# Patient Record
Sex: FEMALE | Race: WHITE | Employment: FULL TIME | URBAN - METROPOLITAN AREA
[De-identification: names, ages, dates, MRNs, and addresses within clinical notes are randomized per-mention and may not be internally consistent; named-entity substitution may affect disease eponyms.]

---

## 2017-01-30 ENCOUNTER — APPOINTMENT (OUTPATIENT)
Dept: CT IMAGING | Facility: CLINIC | Age: 52
End: 2017-01-30
Attending: EMERGENCY MEDICINE
Payer: COMMERCIAL

## 2017-01-30 ENCOUNTER — HOSPITAL ENCOUNTER (OUTPATIENT)
Facility: CLINIC | Age: 52
Setting detail: OBSERVATION
Discharge: HOME | End: 2017-01-31
Attending: EMERGENCY MEDICINE | Admitting: UROLOGY
Payer: COMMERCIAL

## 2017-01-30 DIAGNOSIS — N23 RENAL COLIC: ICD-10-CM

## 2017-01-30 DIAGNOSIS — N20.0 LEFT NEPHROLITHIASIS: ICD-10-CM

## 2017-01-30 DIAGNOSIS — N20.1 CALCULUS OF URETER: Primary | ICD-10-CM

## 2017-01-30 LAB
ALBUMIN UR-MCNC: 30 MG/DL
ANION GAP SERPL CALCULATED.3IONS-SCNC: 11 MMOL/L (ref 3–14)
APPEARANCE UR: ABNORMAL
BILIRUB UR QL STRIP: NEGATIVE
BUN SERPL-MCNC: 18 MG/DL (ref 7–30)
CALCIUM SERPL-MCNC: 8.7 MG/DL (ref 8.5–10.1)
CAOX CRY #/AREA URNS HPF: ABNORMAL /HPF
CHLORIDE SERPL-SCNC: 106 MMOL/L (ref 94–109)
CO2 SERPL-SCNC: 22 MMOL/L (ref 20–32)
COLOR UR AUTO: ABNORMAL
CREAT SERPL-MCNC: 0.68 MG/DL (ref 0.52–1.04)
GFR SERPL CREATININE-BSD FRML MDRD: NORMAL ML/MIN/1.7M2
GLUCOSE SERPL-MCNC: 94 MG/DL (ref 70–99)
GLUCOSE UR STRIP-MCNC: NEGATIVE MG/DL
HGB UR QL STRIP: ABNORMAL
KETONES UR STRIP-MCNC: 5 MG/DL
LEUKOCYTE ESTERASE UR QL STRIP: ABNORMAL
MUCOUS THREADS #/AREA URNS LPF: PRESENT /LPF
NITRATE UR QL: NEGATIVE
PH UR STRIP: 6.5 PH (ref 5–7)
POTASSIUM SERPL-SCNC: 4.1 MMOL/L (ref 3.4–5.3)
RBC #/AREA URNS AUTO: >182 /HPF (ref 0–2)
SODIUM SERPL-SCNC: 139 MMOL/L (ref 133–144)
SP GR UR STRIP: 1.01 (ref 1–1.03)
SQUAMOUS #/AREA URNS AUTO: 3 /HPF (ref 0–1)
URN SPEC COLLECT METH UR: ABNORMAL
UROBILINOGEN UR STRIP-MCNC: NORMAL MG/DL (ref 0–2)
WBC #/AREA URNS AUTO: 0 /HPF (ref 0–2)

## 2017-01-30 PROCEDURE — 81001 URINALYSIS AUTO W/SCOPE: CPT | Performed by: EMERGENCY MEDICINE

## 2017-01-30 PROCEDURE — 96374 THER/PROPH/DIAG INJ IV PUSH: CPT

## 2017-01-30 PROCEDURE — 25000128 H RX IP 250 OP 636: Performed by: EMERGENCY MEDICINE

## 2017-01-30 PROCEDURE — 96375 TX/PRO/DX INJ NEW DRUG ADDON: CPT

## 2017-01-30 PROCEDURE — 87086 URINE CULTURE/COLONY COUNT: CPT | Performed by: PHYSICIAN ASSISTANT

## 2017-01-30 PROCEDURE — 25000128 H RX IP 250 OP 636: Performed by: PHYSICIAN ASSISTANT

## 2017-01-30 PROCEDURE — G0378 HOSPITAL OBSERVATION PER HR: HCPCS

## 2017-01-30 PROCEDURE — 25000125 ZZHC RX 250: Performed by: EMERGENCY MEDICINE

## 2017-01-30 PROCEDURE — 25000125 ZZHC RX 250: Performed by: PHYSICIAN ASSISTANT

## 2017-01-30 PROCEDURE — 96372 THER/PROPH/DIAG INJ SC/IM: CPT

## 2017-01-30 PROCEDURE — 80048 BASIC METABOLIC PNL TOTAL CA: CPT | Performed by: EMERGENCY MEDICINE

## 2017-01-30 PROCEDURE — 99285 EMERGENCY DEPT VISIT HI MDM: CPT | Mod: 25

## 2017-01-30 PROCEDURE — 25000132 ZZH RX MED GY IP 250 OP 250 PS 637: Performed by: EMERGENCY MEDICINE

## 2017-01-30 PROCEDURE — 96376 TX/PRO/DX INJ SAME DRUG ADON: CPT

## 2017-01-30 PROCEDURE — 74176 CT ABD & PELVIS W/O CONTRAST: CPT

## 2017-01-30 PROCEDURE — 96361 HYDRATE IV INFUSION ADD-ON: CPT

## 2017-01-30 PROCEDURE — 99219 ZZC INITIAL OBSERVATION CARE,LEVL II: CPT | Performed by: PHYSICIAN ASSISTANT

## 2017-01-30 PROCEDURE — 87088 URINE BACTERIA CULTURE: CPT | Performed by: PHYSICIAN ASSISTANT

## 2017-01-30 RX ORDER — ACETAMINOPHEN 650 MG/1
650 SUPPOSITORY RECTAL EVERY 4 HOURS PRN
Status: DISCONTINUED | OUTPATIENT
Start: 2017-01-30 | End: 2017-01-31 | Stop reason: HOSPADM

## 2017-01-30 RX ORDER — LORAZEPAM 2 MG/ML
1 INJECTION INTRAMUSCULAR ONCE
Status: COMPLETED | OUTPATIENT
Start: 2017-01-30 | End: 2017-01-30

## 2017-01-30 RX ORDER — ACETAMINOPHEN 325 MG/1
650 TABLET ORAL EVERY 4 HOURS PRN
Status: DISCONTINUED | OUTPATIENT
Start: 2017-01-30 | End: 2017-01-31

## 2017-01-30 RX ORDER — ONDANSETRON 4 MG/1
4 TABLET, ORALLY DISINTEGRATING ORAL EVERY 6 HOURS PRN
Qty: 10 TABLET | Refills: 0 | Status: SHIPPED | OUTPATIENT
Start: 2017-01-30 | End: 2017-01-31

## 2017-01-30 RX ORDER — AMOXICILLIN 250 MG
1-2 CAPSULE ORAL 2 TIMES DAILY PRN
Status: DISCONTINUED | OUTPATIENT
Start: 2017-01-30 | End: 2017-01-31 | Stop reason: HOSPADM

## 2017-01-30 RX ORDER — HYDROMORPHONE HYDROCHLORIDE 1 MG/ML
.3-.5 INJECTION, SOLUTION INTRAMUSCULAR; INTRAVENOUS; SUBCUTANEOUS
Status: DISCONTINUED | OUTPATIENT
Start: 2017-01-30 | End: 2017-01-31 | Stop reason: HOSPADM

## 2017-01-30 RX ORDER — CEFAZOLIN SODIUM 2 G/100ML
2 INJECTION, SOLUTION INTRAVENOUS
Status: COMPLETED | OUTPATIENT
Start: 2017-01-31 | End: 2017-01-31

## 2017-01-30 RX ORDER — SODIUM CHLORIDE 9 MG/ML
INJECTION, SOLUTION INTRAVENOUS CONTINUOUS
Status: DISCONTINUED | OUTPATIENT
Start: 2017-01-30 | End: 2017-01-31

## 2017-01-30 RX ORDER — HYDROMORPHONE HYDROCHLORIDE 1 MG/ML
0.5 INJECTION, SOLUTION INTRAMUSCULAR; INTRAVENOUS; SUBCUTANEOUS
Status: DISCONTINUED | OUTPATIENT
Start: 2017-01-30 | End: 2017-01-30

## 2017-01-30 RX ORDER — POLYETHYLENE GLYCOL 3350 17 G/17G
17 POWDER, FOR SOLUTION ORAL DAILY PRN
Status: DISCONTINUED | OUTPATIENT
Start: 2017-01-30 | End: 2017-01-31 | Stop reason: HOSPADM

## 2017-01-30 RX ORDER — PROCHLORPERAZINE MALEATE 5 MG
5-10 TABLET ORAL EVERY 6 HOURS PRN
Status: DISCONTINUED | OUTPATIENT
Start: 2017-01-30 | End: 2017-01-31 | Stop reason: HOSPADM

## 2017-01-30 RX ORDER — NALOXONE HYDROCHLORIDE 0.4 MG/ML
.1-.4 INJECTION, SOLUTION INTRAMUSCULAR; INTRAVENOUS; SUBCUTANEOUS
Status: DISCONTINUED | OUTPATIENT
Start: 2017-01-30 | End: 2017-01-31

## 2017-01-30 RX ORDER — SODIUM CHLORIDE 9 MG/ML
1000 INJECTION, SOLUTION INTRAVENOUS CONTINUOUS
Status: DISCONTINUED | OUTPATIENT
Start: 2017-01-30 | End: 2017-01-30

## 2017-01-30 RX ORDER — KETOROLAC TROMETHAMINE 30 MG/ML
30 INJECTION, SOLUTION INTRAMUSCULAR; INTRAVENOUS ONCE
Status: COMPLETED | OUTPATIENT
Start: 2017-01-30 | End: 2017-01-30

## 2017-01-30 RX ORDER — OXYCODONE AND ACETAMINOPHEN 5; 325 MG/1; MG/1
1 TABLET ORAL ONCE
Status: COMPLETED | OUTPATIENT
Start: 2017-01-30 | End: 2017-01-30

## 2017-01-30 RX ORDER — LIDOCAINE 40 MG/G
CREAM TOPICAL
Status: DISCONTINUED | OUTPATIENT
Start: 2017-01-30 | End: 2017-01-31 | Stop reason: HOSPADM

## 2017-01-30 RX ORDER — ONDANSETRON 2 MG/ML
4 INJECTION INTRAMUSCULAR; INTRAVENOUS EVERY 6 HOURS PRN
Status: DISCONTINUED | OUTPATIENT
Start: 2017-01-30 | End: 2017-01-31

## 2017-01-30 RX ORDER — BISACODYL 10 MG
10 SUPPOSITORY, RECTAL RECTAL DAILY PRN
Status: DISCONTINUED | OUTPATIENT
Start: 2017-01-30 | End: 2017-01-31 | Stop reason: HOSPADM

## 2017-01-30 RX ORDER — OXYCODONE AND ACETAMINOPHEN 5; 325 MG/1; MG/1
1-2 TABLET ORAL EVERY 4 HOURS PRN
Qty: 15 TABLET | Refills: 0 | Status: SHIPPED | OUTPATIENT
Start: 2017-01-30 | End: 2017-01-31

## 2017-01-30 RX ORDER — ONDANSETRON 4 MG/1
4 TABLET, ORALLY DISINTEGRATING ORAL EVERY 6 HOURS PRN
Status: DISCONTINUED | OUTPATIENT
Start: 2017-01-30 | End: 2017-01-31

## 2017-01-30 RX ORDER — TAMSULOSIN HYDROCHLORIDE 0.4 MG/1
0.4 CAPSULE ORAL DAILY
Qty: 5 CAPSULE | Refills: 0 | Status: SHIPPED | OUTPATIENT
Start: 2017-01-30 | End: 2017-01-31

## 2017-01-30 RX ORDER — PROCHLORPERAZINE 25 MG
25 SUPPOSITORY, RECTAL RECTAL EVERY 12 HOURS PRN
Status: DISCONTINUED | OUTPATIENT
Start: 2017-01-30 | End: 2017-01-31 | Stop reason: HOSPADM

## 2017-01-30 RX ORDER — ONDANSETRON 2 MG/ML
4 INJECTION INTRAMUSCULAR; INTRAVENOUS EVERY 30 MIN PRN
Status: DISCONTINUED | OUTPATIENT
Start: 2017-01-30 | End: 2017-01-30

## 2017-01-30 RX ADMIN — ONDANSETRON HYDROCHLORIDE 4 MG: 2 SOLUTION INTRAMUSCULAR; INTRAVENOUS at 12:53

## 2017-01-30 RX ADMIN — LORAZEPAM 1 MG: 2 INJECTION INTRAMUSCULAR; INTRAVENOUS at 07:31

## 2017-01-30 RX ADMIN — HYDROMORPHONE HYDROCHLORIDE 0.5 MG: 1 INJECTION, SOLUTION INTRAMUSCULAR; INTRAVENOUS; SUBCUTANEOUS at 23:36

## 2017-01-30 RX ADMIN — SODIUM CHLORIDE 1000 ML: 9 INJECTION, SOLUTION INTRAVENOUS at 12:51

## 2017-01-30 RX ADMIN — HYDROMORPHONE HYDROCHLORIDE 0.5 MG: 1 INJECTION, SOLUTION INTRAMUSCULAR; INTRAVENOUS; SUBCUTANEOUS at 13:48

## 2017-01-30 RX ADMIN — HYDROMORPHONE HYDROCHLORIDE 0.5 MG: 1 INJECTION, SOLUTION INTRAMUSCULAR; INTRAVENOUS; SUBCUTANEOUS at 18:44

## 2017-01-30 RX ADMIN — HYDROMORPHONE HYDROCHLORIDE 0.5 MG: 1 INJECTION, SOLUTION INTRAMUSCULAR; INTRAVENOUS; SUBCUTANEOUS at 21:22

## 2017-01-30 RX ADMIN — KETOROLAC TROMETHAMINE 30 MG: 30 INJECTION, SOLUTION INTRAMUSCULAR at 07:44

## 2017-01-30 RX ADMIN — HYDROMORPHONE HYDROCHLORIDE 0.5 MG: 1 INJECTION, SOLUTION INTRAMUSCULAR; INTRAVENOUS; SUBCUTANEOUS at 07:44

## 2017-01-30 RX ADMIN — KETOROLAC TROMETHAMINE 30 MG: 30 INJECTION, SOLUTION INTRAMUSCULAR at 12:52

## 2017-01-30 RX ADMIN — HYDROMORPHONE HYDROCHLORIDE 0.5 MG: 1 INJECTION, SOLUTION INTRAMUSCULAR; INTRAVENOUS; SUBCUTANEOUS at 16:00

## 2017-01-30 RX ADMIN — SODIUM CHLORIDE: 9 INJECTION, SOLUTION INTRAVENOUS at 13:48

## 2017-01-30 RX ADMIN — OXYCODONE HYDROCHLORIDE AND ACETAMINOPHEN 1 TABLET: 5; 325 TABLET ORAL at 12:30

## 2017-01-30 RX ADMIN — HYDROMORPHONE HYDROCHLORIDE 1 MG: 1 INJECTION, SOLUTION INTRAMUSCULAR; INTRAVENOUS; SUBCUTANEOUS at 07:09

## 2017-01-30 RX ADMIN — ONDANSETRON HYDROCHLORIDE 4 MG: 2 SOLUTION INTRAMUSCULAR; INTRAVENOUS at 07:44

## 2017-01-30 RX ADMIN — HYDROMORPHONE HYDROCHLORIDE 0.5 MG: 1 INJECTION, SOLUTION INTRAMUSCULAR; INTRAVENOUS; SUBCUTANEOUS at 12:52

## 2017-01-30 RX ADMIN — SODIUM CHLORIDE: 9 INJECTION, SOLUTION INTRAVENOUS at 22:49

## 2017-01-30 ASSESSMENT — ENCOUNTER SYMPTOMS: FLANK PAIN: 1

## 2017-01-30 ASSESSMENT — PAIN DESCRIPTION - DESCRIPTORS: DESCRIPTORS: CONSTANT

## 2017-01-30 NOTE — PHARMACY-ADMISSION MEDICATION HISTORY
Admission medication history interview status for the 1/30/2017  admission is complete. See EPIC admission navigator for prior to admission medications     Medication history source reliability:Moderate    Actions taken by pharmacist (provider contacted, etc):Patient states she does not take any Rx or OTC medications at home on a daily basis. Patient, however, was in pain during interview and was very sleepy after being given a dose of Dilaudid.     Additional medication history information not noted on PTA med list :None    Medication reconciliation/reorder completed by provider prior to medication history? No    Time spent in this activity: 5 minutes

## 2017-01-30 NOTE — DISCHARGE INSTRUCTIONS
"   * KIDNEY STONE (w/ Colic)    The sharp cramping pain and nausea/vomiting that you have is due to a small stone which has formed in the kidney and is now passing down a narrow tube (ureter) on its way to your bladder. Once it reaches your bladder, the pain will stop. The stone may pass in your urine stream in one piece. [The size may be 1/16\" to 1/4\" (1-6mm)]. Or, the stone may also break up into kenneth fragments which you may not even notice.  Once you have had a kidney stone there is a risk for recurrence in the future.  HOME CARE:    Drink lots of fluid (at least 8-10 glasses of water a day).    Most stones will pass on their own, but may take from a few hours to a few days.    Each time you urinate, do so in a jar. Pour the urine from the jar through the strainer and into the toilet. Continue doing this until 24 hours after your pain stops. By then, if there was a kidney stone, it should pass from your bladder. Some stones dissolve into sand-like particles and pass right through the strainer. In that case, you won't ever see a stone.    Save any stone that you find in the strainer and bring it to your doctor for analysis. It may be possible to prevent certain types of stones from forming. Therefore, it is important to know what kind of stone you have.    Try to stay as active as possible since this will help the stone pass. Do not stay in bed unless your pain prevents you from getting up. You may notice a red, pink or brown color to your urine. This is normal while passing a kidney stone.  FOLLOW UP with your doctor or return to this facility if the pain lasts more than 48 hours.  GET PROMPT MEDICAL ATTENTION if any of the following occur:    Pain that is not controlled by the medicine given    Repeated vomiting or unable to keep down fluids    Weakness, dizziness or fainting    Fever over 101  F (38.3  C)    Passage of solid red or brown urine (can't see through it) or urine with lots of blood clots    Unable " to pass urine for 8 hours and increasing bladder pressure    [id number] Edilia Gonzalez, [address] [place] [address], [place] [address]. All rights reserved. This information is not intended as a substitute for professional medical care. Always follow your healthcare professional's instructions.

## 2017-01-30 NOTE — H&P
Physician Attestation  I, Mirza Fernandez, saw and evaluated Jess Hudson as part of a shared visit.  I have reviewed and discussed with the advanced practice provider their history, physical and plan.    I personally reviewed the vital signs and medications.    My key history or physical exam findings: pt feeling uncomfortable at this point.  Hx of kidney stones.  Discussed with urology PA and plans for cysto, lithotripsy and stent tomorrow.      Key management decisions made by me: appreciate urology and plan as above.  Continue with pain control and antiemetics as needed.      Mirza Fernandez  Date of Service (when I saw the patient): 01/30/2017

## 2017-01-30 NOTE — ED AVS SNAPSHOT
[facility] Observation Unit    [address] [facility] [address]    Phone:  [phone]                                       Jess Hudson   MRN: [id number]    Department:  [facility]   Date of Visit:  1/30/2017           After Visit Summary Signature Page     I have received my discharge instructions, and my questions have been answered. I have discussed any challenges I see with this plan with the nurse or doctor.    ..........................................................................................................................................  Patient/Patient Representative Signature      ..........................................................................................................................................  Patient Representative Print Name and Relationship to Patient    ..................................................               ................................................  Date                                            Time    ..........................................................................................................................................  Reviewed by Signature/Title    ...................................................              ..............................................  Date                                                            Time

## 2017-01-30 NOTE — ED AVS SNAPSHOT
"  Emergency Department    [address] [facility] [address]    Phone:  [phone]    Fax:  [phone]                                       Jess Hudson   MRN: [id number]    Department:   Emergency Department   Date of Visit:  1/30/2017           Patient Information     Date Of Birth          1965        Your diagnoses for this visit were:     Renal colic        You were seen by Donovan Pastrana MD.      Follow-up Information     Call Aureliano Forrest MD.    Specialty:  Urology    Contact information:    [facility]  [address] [facility] [address]  [facility] [address] [phone]          Discharge Instructions          * KIDNEY STONE (w/ Colic)    The sharp cramping pain and nausea/vomiting that you have is due to a small stone which has formed in the kidney and is now passing down a narrow tube (ureter) on its way to your bladder. Once it reaches your bladder, the pain will stop. The stone may pass in your urine stream in one piece. [The size may be 1/16\" to 1/4\" (1-6mm)]. Or, the stone may also break up into kenneth fragments which you may not even notice.  Once you have had a kidney stone there is a risk for recurrence in the future.  HOME CARE:    Drink lots of fluid (at least 8-10 glasses of water a day).    Most stones will pass on their own, but may take from a few hours to a few days.    Each time you urinate, do so in a jar. Pour the urine from the jar through the strainer and into the toilet. Continue doing this until 24 hours after your pain stops. By then, if there was a kidney stone, it should pass from your bladder. Some stones dissolve into sand-like particles and pass right through the strainer. In that case, you won't ever see a stone.    Save any stone that you find in the strainer and bring it to your doctor for analysis. It may be possible to prevent certain types of stones from forming. Therefore, it is important to know what kind of stone you have.    Try to " stay as active as possible since this will help the stone pass. Do not stay in bed unless your pain prevents you from getting up. You may notice a red, pink or brown color to your urine. This is normal while passing a kidney stone.  FOLLOW UP with your doctor or return to this facility if the pain lasts more than 48 hours.  GET PROMPT MEDICAL ATTENTION if any of the following occur:    Pain that is not controlled by the medicine given    Repeated vomiting or unable to keep down fluids    Weakness, dizziness or fainting    Fever over 101  F (38.3  C)    Passage of solid red or brown urine (can't see through it) or urine with lots of blood clots    Unable to pass urine for 8 hours and increasing bladder pressure    [id number] [facility], [address] [place] [address], [place] [address]. All rights reserved. This information is not intended as a substitute for professional medical care. Always follow your healthcare professional's instructions.      24 Hour Appointment Hotline       To make an appointment at any [facility], call [phone] ([phone]). If you don't have a family doctor or clinic, we will help you find one. [facility] clinics are conveniently located to serve the needs of you and your family.             Review of your medicines      START taking        Dose / Directions Last dose taken    ondansetron 4 MG ODT tab   Commonly known as:  ZOFRAN ODT   Dose:  4 mg   Quantity:  10 tablet        Place 1 tablet (4 mg) under the tongue every 6 hours as needed for nausea   Refills:  0        oxyCODONE-acetaminophen 5-325 MG per tablet   Commonly known as:  PERCOCET   Dose:  1-2 tablet   Quantity:  15 tablet        Take 1-2 tablets by mouth every 4 hours as needed for pain   Refills:  0        tamsulosin 0.4 MG capsule   Commonly known as:  FLOMAX   Dose:  0.4 mg   Quantity:  5 capsule        Take 1 capsule (0.4 mg) by mouth daily   Refills:  0                Prescriptions were sent or printed at these  locations (3 Prescriptions)                   Other Prescriptions                Printed at Department/Unit printer (3 of 3)         oxyCODONE-acetaminophen (PERCOCET) 5-325 MG per tablet               ondansetron (ZOFRAN ODT) 4 MG ODT tab               tamsulosin (FLOMAX) 0.4 MG capsule                Procedures and tests performed during your visit     Procedure/Test Number of Times Performed    CT Abdomen Pelvis w/o Contrast 1    Peripheral IV: Standard 1    Pulse oximetry nursing 2    Strain urine 1    UA with Microscopic 1      Orders Needing Specimen Collection     None      Pending Results     Date and Time Order Name Status Description    1/30/2017 0707 CT Abdomen Pelvis w/o Contrast Preliminary             Pending Culture Results     No orders found from 1/29/2017 to 1/31/2017.       Test Results from your hospital stay           1/30/2017  9:30 AM - Interface, [employer] Results      Component Results     Component Value Ref Range & Units Status    Color Urine Light Red  Final    Appearance Urine Slightly Cloudy  Final    Glucose Urine Negative NEG mg/dL Final    Bilirubin Urine Negative NEG Final    Ketones Urine 5 (A) NEG mg/dL Final    Specific Gravity Urine 1.015 1.003 - 1.035 Final    Blood Urine Large (A) NEG Final    pH Urine 6.5 5.0 - 7.0 pH Final    Protein Albumin Urine 30 (A) NEG mg/dL Final    Urobilinogen mg/dL Normal 0.0 - 2.0 mg/dL Final    Nitrite Urine Negative NEG Final    Leukocyte Esterase Urine Trace (A) NEG Final    Source Midstream Urine  Final    WBC Urine 0 0 - 2 /HPF Final    RBC Urine >182 (H) 0 - 2 /HPF Final    Squamous Epithelial /HPF Urine 3 (H) 0 - 1 /HPF Final    Mucous Urine Present (A) NEG /LPF Final    Calcium Oxalate Many (A) NEG /HPF Final         1/30/2017  8:33 AM - Interface, Radiant Ib      Narrative     CT ABDOMEN PELVIS WITHOUT CONTRAST January 30, 2017 8:08 AM     HISTORY: Left flank pain.    TECHNIQUE: Renal stone protocol was performed. Volumetric  helical  sections were acquired from the lung bases through the ischial  tuberosities without IV contrast. Coronal images were also  reconstructed. Radiation dose for this scan was reduced using  automated exposure control, adjustment of the mA and/or kV according  to patient size, or iterative reconstruction technique.    COMPARISON: CT of the abdomen and pelvis without IV contrast performed  4/18/2012.    FINDINGS: There are multiple (at least five) obstructing stones in the  left renal pelvis and proximal left ureter, causing mild to moderate  left hydronephrosis. The largest of these stones is in the left renal  pelvis, measuring 0.7 cm. A 0.3 cm calcification in the left pelvis is  also new since the previous exam, and may be within the distal left  ureter near the ureterovesical junction, although the distal portion  of the left ureter is not well visualized. These left ureteral and  collecting system stones are new since the previous exam. Two  nonobstructing stones in the lower pole of the right kidney measure  0.2 cm each, and are not significantly changed since the previous  exam. No hydronephrosis on the right.     No bowel obstruction. The appendix is not visualized. No free fluid in  the pelvis. The liver, gallbladder, spleen, adrenal glands, and  pancreas have unremarkable noncontrast appearances. A left ovarian  cystic lesion measures 3.6 cm. Small hiatal hernia. Bilateral breast  implants are partially included on this exam. The visualized lung  bases are clear.        Impression     IMPRESSION:   1. There are multiple obstructing stones in the left renal pelvis and  proximal left ureter, causing mild to moderate left hydronephrosis.  The largest stone is in the left renal pelvis and measures 0.7 cm.   2. There may also be an obstructing stone in the distal left ureter  measuring 0.3 cm.   3. Two nonobstructing stones in the right kidney measures 0.2 cm each.    4. There is a 3.6 cm left ovarian  cystic lesion. Pelvic ultrasound may  be helpful for further characterization.                        Clinical Quality Measure: Blood Pressure Screening     Your blood pressure was checked while you were in the emergency department today. The last reading we obtained was  BP: 119/81 mmHg . Please read the guidelines below about what these numbers mean and what you should do about them.  If your systolic blood pressure (the top number) is less than 120 and your diastolic blood pressure (the bottom number) is less than 80, then your blood pressure is normal. There is nothing more that you need to do about it.  If your systolic blood pressure (the top number) is 120-139 or your diastolic blood pressure (the bottom number) is 80-89, your blood pressure may be higher than it should be. You should have your blood pressure rechecked within a year by a primary care provider.  If your systolic blood pressure (the top number) is 140 or greater or your diastolic blood pressure (the bottom number) is 90 or greater, you may have high blood pressure. High blood pressure is treatable, but if left untreated over time it can put you at risk for heart attack, stroke, or kidney failure. You should have your blood pressure rechecked by a primary care provider within the next 4 weeks.  If your provider in the emergency department today gave you specific instructions to follow-up with your doctor or provider even sooner than that, you should follow that instruction and not wait for up to 4 weeks for your follow-up visit.        Thank you for choosing [facility]       Thank you for choosing [facility] for your care. Our goal is always to provide you with excellent care. Hearing back from our patients is one way we can continue to improve our services. Please take a few minutes to complete the written survey that you may receive in the mail after you visit with us. Thank you!        [facility] [employer] Information     [employer] lets you send messages to  "your doctor, view your test results, renew your prescriptions, schedule appointments and more. To sign up, go to [email or web address] . Click on \"Log in\" on the left side of the screen, which will take you to the Welcome page. Then click on \"Sign up Now\" on the right side of the page.     You will be asked to enter the access code listed below, as well as some personal information. Please follow the directions to create your username and password.     Your access code is: [email or web address]  Expires: 2017  9:09 AM     Your access code will  in 90 days. If you need help or a new code, please call your [facility] clinic or [phone].        Care EveryWhere ID     This is your Care EveryWhere ID. This could be used by other organizations to access your [facility] medical records  [id number]        After Visit Summary       This is your record. Keep this with you and show to your community pharmacist(s) and doctor(s) at your next visit.                  "

## 2017-01-30 NOTE — PROGRESS NOTES
Goals to be met before Discharge:    1) Urology evaluation - Met  2) Pain control - Not met  3) Surgery - Not met, scheduled for 0800 tomorrow    Nurse to notify provider when goals are met

## 2017-01-30 NOTE — PLAN OF CARE
Problem: Goal Outcome Summary  Goal: Goal Outcome Summary  Outcome: No Change  VSS. A/O. Plan for NPO at midnight for surgery tomorrow @ 0800 with Dr. Forrest.  IV Dilaudid given with some relief. Patient has not voided since arriving to unit, strainer in bathroom. Up SBA.

## 2017-01-30 NOTE — CONSULTS
Urology consult for: Obstructing nephrolithiasis in the right ureter   Req provider: DANNA Lora      HPI: Pt is an assigned 52yo female pt of Dr. Limon with a PMH significant for complex urolithiasis. Pt last had obstructing stones in 2012 with a right stent placed 4/21/12 by Dr. Forrest and a right ESWL performed by Dr. Forrest 5/2/12, preceded by temporary RIGHT PCNT due to impacted right UPJ stones.  Pt has chosen not to followup with Dr. Forrest since the stent removal after surgery. Pt has had a colic attack she thinks last yr, evaluated at [facility]. Have no access to those records and pt unable to recall any specifics.    Pt presented to [facility] ED this morning at 7am with severe right flank pain. Pt reports the pain started 30 minutes prior to presentation and has been severe since then. Pt reports the pain is in her low left flank and wraps around the left lateral abdomen. Pt denies any other symptoms. Pt denies frequency, urgency, dysuria, hematuria, pyuria, or concern for UTI. Pt is unsure if she's emptying her bladder well due to pain. Pt's only complaint is severe pain. Pt is amenable to surgery, but defers to her mother who is at bedside.       Past Medical History   Diagnosis Date     Kidney stone      Review Of Systems:  General: Denies F/C, history limited to pt's distress from pain  Respiratory: Denies SOB  Cardiovascular: Denies CP  Gastrointestinal: Denies N/V  Genitourinary: See HPI  Musculoskeletal: Denies LE pain  Neurologic: Denies HA  Psychiatric: Denies confusion  Skin: no concerning lesions    Past Surgical History   Procedure Laterality Date     Appendectomy       Breast surgery       enlarged     Cystoscopy, retrogrades, insert stent ureter(s), combined  4/21/2012     Procedure:COMBINED CYSTOSCOPY, RETROGRADES, INSERT STENT URETER(S); Cysto,Attempted Right Double J Stent Placement; Surgeon:VAISHALI FORREST; Location: OR     Extracorporeal shock wave lithotripsy (eswl)  5/2/2012      Procedure:EXTRACORPOREAL SHOCK WAVE LITHOTRIPSY (ESWL); Right  EXTRACORPOREAL SHOCK WAVE LITHOTRIPSY, removal of right stent; Surgeon:VAISHALI COX; Location: OR     Cystoscopy, remove stent(s), combined  5/2/2012     Procedure:COMBINED CYSTOSCOPY, REMOVE STENT(S); Surgeon:VAISHALI COX; Location: OR     Social Hx:  Social History     Social History     Marital Status: Single     Spouse Name: N/A     Number of Children: N/A     Years of Education: N/A     Occupational History     Not on file.     Social History Main Topics     Smoking status: Former Smoker     Types: Cigarettes     Quit date: 05/01/2005     Smokeless tobacco: Not on file     Alcohol Use: No     Drug Use: No     Sexual Activity: Not on file     Other Topics Concern     Not on file     Social History Narrative     Meds:  Current Outpatient Prescriptions   Medication Sig Dispense Refill     oxyCODONE-acetaminophen (PERCOCET) 5-325 MG per tablet Take 1-2 tablets by mouth every 4 hours as needed for pain 15 tablet 0     ondansetron (ZOFRAN ODT) 4 MG ODT tab Place 1 tablet (4 mg) under the tongue every 6 hours as needed for nausea 10 tablet 0     tamsulosin (FLOMAX) 0.4 MG capsule Take 1 capsule (0.4 mg) by mouth daily 5 capsule 0     Allergies   Allergen Reactions     Amoxicillin      As a child, unknown     Labs:  ROUTINE IP LABS (Last four results)  BMP  Recent Labs  Lab 01/30/17  0740      POTASSIUM 4.1   CHLORIDE 106   BUD 8.7   CO2 22   BUN 18   CR 0.68   GLC 94     CBCNo lab results found in last 7 days.  INRNo lab results found in last 7 days.    UA RESULTS:  Recent Labs   Lab Test  01/30/17   0911   COLOR  Light Red   APPEARANCE  Slightly Cloudy   URINEGLC  Negative   URINEBILI  Negative   URINEKETONE  5*   SG  1.015   UBLD  Large*   URINEPH  6.5   PROTEIN  30*   NITRITE  Negative   LEUKEST  Trace*   RBCU  >182*   WBCU  0     Imaging:CT ABDOMEN PELVIS WITHOUT CONTRAST January 30, 2017 8:08 AM       HISTORY: Left flank  pain.     TECHNIQUE: Renal stone protocol was performed. Volumetric helical  sections were acquired from the lung bases through the ischial  tuberosities without IV contrast. Coronal images were also  reconstructed. Radiation dose for this scan was reduced using  automated exposure control, adjustment of the mA and/or kV according  to patient size, or iterative reconstruction technique.     COMPARISON: CT of the abdomen and pelvis without IV contrast performed  4/18/2012.     FINDINGS: There are multiple (at least five) obstructing stones in the  left renal pelvis and proximal left ureter, causing mild to moderate  left hydronephrosis. The largest of these stones is in the left renal  pelvis, measuring 0.7 cm. A 0.3 cm calcification in the left pelvis is  also new since the previous exam, and may be within the distal left  ureter near the ureterovesical junction, although the distal portion  of the left ureter is not well visualized. These left ureteral and  collecting system stones are new since the previous exam. Two  nonobstructing stones in the lower pole of the right kidney measure  0.2 cm each, and are not significantly changed since the previous  exam. No hydronephrosis on the right.       No bowel obstruction. The appendix is not visualized. No free fluid in  the pelvis. The liver, gallbladder, spleen, adrenal glands, and  pancreas have unremarkable noncontrast appearances. A left ovarian  cystic lesion measures 3.6 cm. Small hiatal hernia. Bilateral breast  implants are partially included on this exam. The visualized lung  bases are clear.                                                                       IMPRESSION:    1. There are multiple obstructing stones in the left renal pelvis and  proximal left ureter, causing mild to moderate left hydronephrosis.  The largest stone is in the left renal pelvis and measures 0.7 cm.    2. There may also be an obstructing stone in the distal left ureter  measuring  "0.3 cm.    3. Two nonobstructing stones in the right kidney measures 0.2 cm each.     4. There is a 3.6 cm left ovarian cystic lesion. Pelvic ultrasound may  be helpful for further characterization.    Exam:  /63 mmHg  Pulse 74  Temp(Src) 95.5  F (35.3  C) (Axillary)  Resp 16  Ht 1.651 m (5' 5\")  Wt 65.137 kg (143 lb 9.6 oz)  BMI 23.90 kg/m2  SpO2 99%    Intake/Output Summary (Last 24 hours) at 01/30/17 1358  Last data filed at 01/30/17 1348   Gross per 24 hour   Intake   1000 ml   Output     30 ml   Net    970 ml     EXAM:  Constitutional: healthy, alert, cooperative, pt in some distress due to pain and unable to fully answer questions, though short answers she does give are appropriate   Cardiovascular: RRR  Respiratory: CTAB anteriorly, no secondary muscle use  Psychiatric: mentation appears normal and affect appropriate for a person in severe pain  Neck: no asymmetry  Abdomen: abdomen soft, non-tender on the right, diffuse left tenderness, severe low left flank pain not truly in the CVA region. No masses, no CVAT bilaterally  : No urinary catheter in place  MSK: no calf tenderness, no edema  Skin: no open lesions, extremities warm and well perfused      Assessment/plan:    Multiple obstructing stones in the left renal pelvis and proximal left ureter, causing mild to moderate left hydronephrosis.   - Plan for cysto, left ureteroscopy, holmium laser lithotripsy, left stent placement tomorrow at 8am with Dr. Forrest   -NPO at midnight   -Pain control, general medical concerns per hospitalist service      Discussed exam findings, lab and imaging results and plan with Dr. Forrest.  Please contact me with any questions or concerns.     Lisbet Coronel PA-C  [facility]  Pager:  [phone]  After 4pm and on weekends, please call [phone]    I reviewed most recent CT body imaging. I examined the pt and took a detailed history.   Jess has not followed up as I suggested in my last consultation " with her. I explained, again, to her and her mother that she over produces urinary oxalate (24-hr urine results in '12) and will likely be a lifelong stone . Emphasized the need for regular OV's to avoid episodes like what she is experiencing now. I explained that based on prior hx, may have difficulty accessing her left upper tracts. If that is indeed that case, like last hospitalization in '12, may need a temporary PCNT. She understands.     45 min total consultation time, >50% spent face to face with pt.advising Rx plan specific to her urological issues.   Aureliano Forrest

## 2017-01-30 NOTE — ED PROVIDER NOTES
History     Chief Complaint:  Flank Pain    HPI   Patient history limited secondary to distress    Jess Hudson is a 51 year old female with a history of recurrent kidney stones s/p lithotripsy and stent who presents with flank pain. The patient reports that she woke up this morning with severe left sided flank pain, prompting her to come to the ED for evaluation. On arrival to the ED, the patient reports she is in severe pain and is otherwise unable to provide any history.     Allergies:  Amoxicillin  Morphine sulfate      Medications:    No current outpatient prescriptions on file.    Past Medical History:    Kidney stone     Past Surgical History:    Appendectomy  Breast surgery  Cystoscopy, insert stent and subsequent removal   Lithotripsy     Family History:    No family history on file.    Social History:  Smoking status: Former smoker, quit 2005  Alcohol use: No  Marital Status:  Single [1]     Review of Systems   Unable to perform ROS: Acuity of condition   Genitourinary: Positive for flank pain.       Physical Exam   Patient Vitals for the past 24 hrs:   BP Temp Temp src Pulse Heart Rate Resp SpO2   01/30/17 1256 122/69 mmHg - - - - - -   01/30/17 1055 - - - - - - 98 %   01/30/17 1054 119/81 mmHg - - - - - 100 %   01/30/17 0955 131/88 mmHg - - - - - 98 %   01/30/17 0912 - 97.1  F (36.2  C) Oral - - - -   01/30/17 0813 125/78 mmHg - - - 55 12 92 %   01/30/17 0703 96/84 mmHg - - 74 - 30 100 %      Physical Exam  Constitutional: The patient is oriented to person, place, and time. Anxious. Uncomfortable. Writhing around on bed.   HENT:   Head: Atraumatic  Right Ear: Normal  Left Ear: Normal  Nose: Nose normal.   Mouth/Throat: Oropharynx is clear and moist. No erythema or exudate.   Eyes: Conjunctivae and EOM are normal. Pupils are equal, round, and reactive to light. No discharge  Neck: Normal range of motion. Neck supple.   Cardiovascular: Normal rate, regular rhythm, no murmur gallops or rubs. Intact  distal pulses.    Pulmonary/Chest: CTA bilaterally. No wheezes rale or rhonchi.  Abdominal: Soft. Non tender.  No masses   Musculoskeletal: No edema. No bony deformity. Normal range of motion  Lymphadenopathy:     The patient has no cervical adenopathy.   Neurological: The patient is alert and oriented to person, place, and time. The patient has normal strength and normal reflexes. No cranial nerve deficit. Coordination normal.  Skin: Skin is warm and dry. No rash noted. The patient is not diaphoretic.   Psychiatric: The patient has a normal mood. Anxious affect.     Emergency Department Course   Imaging:  Radiographic findings were communicated with the patient who voiced understanding of the findings.    CT-scan Abdomen/Pelvis w/o contrast:  1. There are multiple obstructing stones in the left renal pelvis and  proximal left ureter, causing mild to moderate left hydronephrosis.  The largest stone is in the left renal pelvis and measures 0.7 cm.   2. There may also be an obstructing stone in the distal left ureter  measuring 0.3 cm.   3. Two nonobstructing stones in the right kidney measures 0.2 cm each.    4. There is a 3.6 cm left ovarian cystic lesion. Pelvic ultrasound may  be helpful for further characterization.  Preliminary result per radiology.     Laboratory:  UA: Ketones 5, Blood large, Protein albumin 30, Leukocyte esterase trace, RBC/HPF >182(H), Squamous epithelial 3(H), Mucous present, Calcium oxalate many, o/w negative  BMP: WNL (Creatinine 0.68)    Interventions:  0709: Dilaudid 1 mg IM  0731: Ativan 1 mg IM  0744: Dilaudid 0.5 mg IV  0744: Toradol 30 mg IV  0744: Zofran 4 mg IV  1252: Toradol 30 mg IV  1230: Percocet 1 tablet oral     Emergency Department Course:  Past medical records, nursing notes, and vitals reviewed.  0721: I performed an exam of the patient and obtained history, as documented above.  IV inserted and blood drawn.  The patient was sent for a CT abdomen pelvis while in the  emergency department, findings above.    0851: I rechecked the patient. Explained findings to the patient. Patient's pain is somewhat improved.     0913: Patient ambulated to the bathroom without difficulty.     0937: I rechecked the patient. Patient states she would like to go home if possible.     1007: Urology paged  1034: Called back urology  1110: Called urology back again  1150: I spoke to Dr. Lyle of urology.    1159: I rechecked the patient. Explained findings to the patient. The patient would still like to go home.     1230: As the patient was being discharged she starting complaining of return of her 9/10 pain. Will plan for admit.   IV started    1238: I spoke to Dr. Forrest of urology.   1255: I spoke to Dr. Fernandez of the hospitalist service who accepts the patient for admission.     Findings and plan explained to the Patient who consents to admission.   Discussed the patient with Dr. Fernandez, who will admit the patient to an obs bed for further monitoring, evaluation, and treatment.     Impression & Plan      Medical Decision Making:  Patient presents with severe left sided flank pain. Based on history and exam I suspect this likely represents renal colic. CT scan of the abdomen and pelvis does show a large obstructing intrarenal pelvis stone as well as a proximal ureteral stone and distal ureteral stone. Urinalysis shows hematuria but no signs that would suggest infectious process. Patient was quite anxious, IV was started and she initially received IM Dilaudid and then IV Dilaudid and Toradol was able to get her pain under control. We discussed that she would likely need to have some type of intervention given the size and location of the stone. She was feeling better and wanted to make a try at home and was up for discharge when her pain became significantly worse and it was decided at this time that she would come in for pain control. I spoke with Dr. Fernandez on for the hospitalist service and Dr. Forrest  on for urology and the patient will be admitted for further evaluation and treatment.     Diagnosis:    ICD-10-CM   1. Renal colic N23     Disposition: Admitted under the care of Dr. Fernandez    Discharge Medications:  New Prescriptions    ONDANSETRON (ZOFRAN ODT) 4 MG ODT TAB    Place 1 tablet (4 mg) under the tongue every 6 hours as needed for nausea    OXYCODONE-ACETAMINOPHEN (PERCOCET) 5-325 MG PER TABLET    Take 1-2 tablets by mouth every 4 hours as needed for pain    TAMSULOSIN (FLOMAX) 0.4 MG CAPSULE    Take 1 capsule (0.4 mg) by mouth daily     Chiquita Fountain  1/30/2017    EMERGENCY DEPARTMENT    I, Chiquita Fountain, am serving as a scribe at 7:21 AM on 1/30/2017 to document services personally performed by Donovan Pastrana MD based on my observations and the provider's statements to me.       Donovan Pastrana MD  01/30/17 5345

## 2017-01-30 NOTE — ED NOTES
Pt stated pain was well controlled upon review of discharge instructions.  Upon arrival of the pts ride, she began having severe back pain again and stated she felt that needed to stay for IV pain management.

## 2017-01-30 NOTE — H&P
PRIMARY CARE PHYSICIAN:  None listed.      CHIEF COMPLAINT:  Left flank pain.      HISTORY OF PRESENT ILLNESS:  Jess Hudson is a 51-year-old female with past medical history significant for recurrent urolithiasis who presented to [facility] Emergency Department earlier today with complaints of left flank pain.  The patient woke up early this morning with severe left-sided flank pain prompting her to come to the Emergency Department for further evaluation.  Due to patient's severe pain, her history is limited.  The patient was seen and evaluated in the Emergency Department by Dr. Pastrana.  Emergency department workup included a BMP within normal limits.  Urinalysis positive for greater than 182 red blood cells, many calcium oxalate.  A CT abdomen and pelvis was performed that showed multiple obstructing stones in the left renal pelvis and proximal left ureter causing mild to moderate left hydronephrosis, largest stone is measuring 0.7 cm.  Due to persistence of her pain, Hospitalist Service was contacted for admission.      Of note, the patient does have history of recurrent urolithiasis dating back into her mid 30s.  She has followed with Dr. Forrest in the past.  She has required ureteral stent placement in the past. The patient reports stone analyses have been done in the past; however, these have been inconclusive.      I met with the patient in the Emergency Department.  The patient is in significant pain and is unable to provide much of a history due to pain.  She reports that her symptoms again began this morning.  She denies any fever or urinary symptoms.  Currently, endorses some pain radiating into her abdomen but otherwise denies any acute chest pain, shortness of breath or issues with diarrhea or constipation.      REVIEW OF SYSTEMS:  A complete review of systems was performed and is negative other than as noted in HPI.      PAST MEDICAL HISTORY:  Recurrent nephrolithiasis.      PRIOR TO ADMISSION  MEDICATIONS:  None.      ALLERGIES:  Amoxicillin.      PAST SURGICAL HISTORY:   1.  Appendectomy.   2.  Breast augmentation.      FAMILY HISTORY:  The patient's father has history of coronary artery disease.      SOCIAL HISTORY:  The patient denies tobacco abuse, alcohol use or illicit drug use.      PHYSICAL EXAMINATION:   VITAL SIGNS:  Temperature 97.1, heart rate 55, blood pressure 122/69, respiratory rate is 16, oxygen saturations 94% on room air.   GENERAL:  Uncomfortable appearing female.   HEENT:  Pupils equal, round and reactive to light.  Mucous membranes moist.   NECK:  No thyromegaly or lymphadenopathy.   CARDIOVASCULAR:  Regular rate and rhythm.  No murmurs.   RESPIRATORY:  Lungs clear to auscultation bilaterally.  No increased work of breathing.   ABDOMEN:  Soft, left-sided tenderness, nondistended.  Normoactive bowel sounds.   EXTREMITIES:  Distal pulses intact in lower extremities bilaterally.  No pedal edema.   NEUROLOGIC:  Oriented x3.   PSYCHIATRIC:  Calm and cooperative.      LABORATORY DATA:  Reviewed in Epic.      ASSESSMENT AND PLAN:  Jess Hudson is a 51-year-old female with past medical history significant for recurrent nephrolithiasis who is being admitted to [facility] observation unit for obstructing left renal stones.      Obstructive renal stones, left renal pelvis and proximal left ureter with moderate left hydronephrosis.  Symptoms began on the morning of 01/30.  CT of the abdomen and pelvis in the Emergency Department demonstrated multiple obstructing stones in the left renal pelvis and proximal left ureter with mild to moderate left hydronephrosis with the largest measuring 0.7 cm.  Urinalysis shows greater than 182 red blood cells and many calcium oxalate.  BMP is within normal limits.  Due to persistence of pain Hospitalist patient will be admitted to observation unit.   --  IV fluids.   --  PRN antiemetics and IV Dilaudid for pain control.   --  Urology consulted, plan  for cysto, left ureteroscopy, and lithotripsy with stent placement tomorrow with Dr. Forrest  --  NPO at midnight   --  Recheck BMP tomorrow morning.     Deep venous thrombosis prophylaxis:  PCDs.      CODE STATUS:  Full code.      The patient was discussed with Dr. Marcelo Fernandez who agrees with the plan as outlined above.         MARCELO FERNANDEZ, DO       As dictated by LANDON RICHARDSON PA-C            D: 2017 13:26   T: 2017 14:06   MT: KEITH      Name:     BIB KWAN   MRN:      [id number]        Account:      [id number]   :      1965           Admitted:     [id number]      Document: [id number]

## 2017-01-30 NOTE — ED AVS SNAPSHOT
MRN:[id number]                      After Visit Summary   1/30/2017    Jess Hudson    MRN: [id number]           Thank you!     Thank you for choosing [facility] for your care. Our goal is always to provide you with excellent care. Hearing back from our patients is one way we can continue to improve our services. Please take a few minutes to complete the written survey that you may receive in the mail after you visit with us. Thank you!        Patient Information     Date Of Birth          1965        About your hospital stay     You were admitted on:  January 30, 2017 [facility]    You were discharged on:  January 31, 2017        Reason for your hospital stay       You were registered to observation due to kidney stone.                  Who to Call     For medical emergencies, please call 911.  For non-urgent questions about your medical care, please call your primary care provider or clinic, None  For questions related to your surgery, please call your surgery clinic        Attending Provider     Provider    Donovan Pastrana MD Maresh, Andrew Bruce, DO Utz, William J, MD       Primary Care Provider    Physician No Ref-Primary       No address on file        After Care Instructions     Activity       Your activity upon discharge: activity as tolerated            Diet       Follow this diet upon discharge: Orders Placed This Encounter  Advance Diet as Tolerated: Regular Diet Adult            Discharge Instructions       You have a left ureteral stent to help allow drainage of urine from your kidney to bladder.  Your stent is not permanent, it requires removal with your urologist based on your plan of care.  While the stent is in place you may notice:  -Flank / back/ side  pain on the same side of your body as the stent  -Blood tinged urine that will come and go  -Sensation that you need to urinate more frequently   -Bladder  cramping/discomfort at the end of urination  These are all normal to experience with the stent in place. However, if these symptoms become too bothersome to tolerate, you can all our office to discuss medications to help with the discomfort, though the stent will need to stay in place until your follow-up appointment to allow your body to heal after surgery.            Discharge Instructions       You are being prescribed a narcotic pain medication. This is a strong pain medication that should be used to keep your pain to a tolerable level, but no more than necessary as it can be addictive and has side effects. With the narcotic pain medicine, you might experience some constipation, so you may want to take an over the counter stool softener like dulcolax, prune juice, or a fiber supplement such as Metamucil or Benefiber to keep your bowel movements soft and regular. You should also be very careful with driving, as the pain medicine may dull your senses and reflexes. Avoid alcohol, as this can unexpectedly increase the effect of the pain medicine.    You are also being prescribed Oxybutyinin for any bladder spasms or frequency with the stent. You should only take this medication if you're having urinary frequency or urgency or bladder spasms. This medication can cause constipation, urinary retention, dry mouth, or vision changes, so stop taking it if you experience these problems.                  Follow-up Appointments     Follow Up       Please follow up with Dr. Forrest on Thursday, February 23th at 2pm for a routine post-hospital check and stent removal.    [facility]  [address] [place] [address], [address]  [place]. [address]    You may call [phone] with any questions or concerns.            Follow Up       You are scheduled for a CT scan at the [facility] office at 10am on Wednesday, 2/22/17. You should come with a comfortably full bladder, but there are otherwise no other preparations that  "need so be done for this appointment    [facility]  [address], [address]  [place]. [address]    You may call [phone] with any questions or concerns.            Follow-up and recommended labs and tests        Follow up with primary care provider, Physician No Ref-Primary, within 7 days for hospital follow- up.  No follow up labs or test are needed.                  Further instructions from your care team          * KIDNEY STONE (w/ Colic)    The sharp cramping pain and nausea/vomiting that you have is due to a small stone which has formed in the kidney and is now passing down a narrow tube (ureter) on its way to your bladder. Once it reaches your bladder, the pain will stop. The stone may pass in your urine stream in one piece. [The size may be 1/16\" to 1/4\" (1-6mm)]. Or, the stone may also break up into kenneth fragments which you may not even notice.  Once you have had a kidney stone there is a risk for recurrence in the future.  HOME CARE:    Drink lots of fluid (at least 8-10 glasses of water a day).    Most stones will pass on their own, but may take from a few hours to a few days.    Each time you urinate, do so in a jar. Pour the urine from the jar through the strainer and into the toilet. Continue doing this until 24 hours after your pain stops. By then, if there was a kidney stone, it should pass from your bladder. Some stones dissolve into sand-like particles and pass right through the strainer. In that case, you won't ever see a stone.    Save any stone that you find in the strainer and bring it to your doctor for analysis. It may be possible to prevent certain types of stones from forming. Therefore, it is important to know what kind of stone you have.    Try to stay as active as possible since this will help the stone pass. Do not stay in bed unless your pain prevents you from getting up. You may notice a red, pink or brown color to your urine. This is normal while " "passing a kidney stone.  FOLLOW UP with your doctor or return to this facility if the pain lasts more than 48 hours.  GET PROMPT MEDICAL ATTENTION if any of the following occur:    Pain that is not controlled by the medicine given    Repeated vomiting or unable to keep down fluids    Weakness, dizziness or fainting    Fever over 101  F (38.3  C)    Passage of solid red or brown urine (can't see through it) or urine with lots of blood clots    Unable to pass urine for 8 hours and increasing bladder pressure    [id number] [facility], [address] [place] [address], [place] [address]. All rights reserved. This information is not intended as a substitute for professional medical care. Always follow your healthcare professional's instructions.      Pending Results     Date and Time Order Name Status Description    1/30/2017 1409 Urine Culture Preliminary             Statement of Approval     Ordered          01/31/17 1231  I have reviewed and agree with all the recommendations and orders detailed in this document.   EFFECTIVE NOW     Approved and electronically signed by:  Jake Wilson PA-C             Admission Information        Provider Department Dept Phone    1/30/2017 Aureliano Forrest MD [facility] [phone]      Your Vitals Were     Blood Pressure Pulse Temperature    97/56 mmHg 74 98.6  F (37  C) (Oral)    Respirations Height Weight    18 1.651 m (5' 5\") 65.137 kg (143 lb 9.6 oz)    BMI (Body Mass Index) Pulse Oximetry       23.90 kg/m2 96%       MyChart Information     [facility] lets you send messages to your doctor, view your test results, renew your prescriptions, schedule appointments and more. To sign up, go to [email or web address] . Click on \"Log in\" on the left side of the screen, which will take you to the Welcome page. Then click on \"Sign up Now\" on the right side of the page.     You will be asked to enter the access code listed below, as well as some personal information. Please follow " the directions to create your username and password.     Your access code is: [email or web address]  Expires: 2017  9:09 AM     Your access code will  in 90 days. If you need help or a new code, please call your [facility] clinic or [phone].        Care EveryWhere ID     This is your Care EveryWhere ID. This could be used by other organizations to access your [facility] medical records  [id number]           Review of your medicines      START taking        Dose / Directions    ondansetron 4 MG ODT tab   Commonly known as:  ZOFRAN ODT        Dose:  4 mg   Place 1 tablet (4 mg) under the tongue every 6 hours as needed for nausea   Quantity:  10 tablet   Refills:  0       oxybutynin 5 MG 24 hr tablet   Commonly known as:  DITROPAN XL   Used for:  Left nephrolithiasis        Dose:  5 mg   Take 1 tablet (5 mg) by mouth daily   Quantity:  90 tablet   Refills:  3       oxyCODONE 5 MG IR tablet   Commonly known as:  ROXICODONE   Used for:  Calculus of ureter        Dose:  5-10 mg   Take 1-2 tablets (5-10 mg) by mouth every 3 hours as needed for moderate to severe pain   Quantity:  20 tablet   Refills:  0            Where to get your medicines      These medications were sent to [facility] - [address]  [address], [place] [address]     Phone:  [phone]    - oxybutynin 5 MG 24 hr tablet      Some of these will need a paper prescription and others can be bought over the counter. Ask your nurse if you have questions.     Bring a paper prescription for each of these medications    - ondansetron 4 MG ODT tab  - oxyCODONE 5 MG IR tablet             Protect others around you: Learn how to safely use, store and throw away your medicines at www.disposemymeds.org.             Medication List: This is a list of all your medications and when to take them. Check marks below indicate your daily home schedule. Keep this list as a reference.      Medications           Morning  Afternoon Evening Bedtime As Needed    ondansetron 4 MG ODT tab   Commonly known as:  ZOFRAN ODT   Place 1 tablet (4 mg) under the tongue every 6 hours as needed for nausea                                oxybutynin 5 MG 24 hr tablet   Commonly known as:  DITROPAN XL   Take 1 tablet (5 mg) by mouth daily                                oxyCODONE 5 MG IR tablet   Commonly known as:  ROXICODONE   Take 1-2 tablets (5-10 mg) by mouth every 3 hours as needed for moderate to severe pain

## 2017-01-31 ENCOUNTER — ANESTHESIA (OUTPATIENT)
Dept: SURGERY | Facility: CLINIC | Age: 52
End: 2017-01-31
Payer: COMMERCIAL

## 2017-01-31 ENCOUNTER — ANESTHESIA EVENT (OUTPATIENT)
Dept: SURGERY | Facility: CLINIC | Age: 52
End: 2017-01-31
Payer: COMMERCIAL

## 2017-01-31 VITALS
HEART RATE: 74 BPM | OXYGEN SATURATION: 96 % | RESPIRATION RATE: 18 BRPM | DIASTOLIC BLOOD PRESSURE: 56 MMHG | SYSTOLIC BLOOD PRESSURE: 97 MMHG | HEIGHT: 65 IN | BODY MASS INDEX: 23.93 KG/M2 | TEMPERATURE: 98.6 F | WEIGHT: 143.6 LBS

## 2017-01-31 PROBLEM — N20.9 UROLITHIASIS: Status: ACTIVE | Noted: 2017-01-31

## 2017-01-31 LAB
ANION GAP SERPL CALCULATED.3IONS-SCNC: 7 MMOL/L (ref 3–14)
BUN SERPL-MCNC: 18 MG/DL (ref 7–30)
CALCIUM SERPL-MCNC: 7.8 MG/DL (ref 8.5–10.1)
CHLORIDE SERPL-SCNC: 108 MMOL/L (ref 94–109)
CO2 SERPL-SCNC: 25 MMOL/L (ref 20–32)
CREAT SERPL-MCNC: 0.86 MG/DL (ref 0.52–1.04)
GFR SERPL CREATININE-BSD FRML MDRD: 70 ML/MIN/1.7M2
GLUCOSE SERPL-MCNC: 85 MG/DL (ref 70–99)
HCG SERPL QL: NEGATIVE
HGB BLD-MCNC: 12.7 G/DL (ref 11.7–15.7)
POTASSIUM SERPL-SCNC: 4.6 MMOL/L (ref 3.4–5.3)
SODIUM SERPL-SCNC: 140 MMOL/L (ref 133–144)

## 2017-01-31 PROCEDURE — 84703 CHORIONIC GONADOTROPIN ASSAY: CPT | Performed by: ANESTHESIOLOGY

## 2017-01-31 PROCEDURE — 37000009 ZZH ANESTHESIA TECHNICAL FEE, EACH ADDTL 15 MIN: Performed by: UROLOGY

## 2017-01-31 PROCEDURE — 80048 BASIC METABOLIC PNL TOTAL CA: CPT | Performed by: PHYSICIAN ASSISTANT

## 2017-01-31 PROCEDURE — 71000012 ZZH RECOVERY PHASE 1 LEVEL 1 FIRST HR: Performed by: UROLOGY

## 2017-01-31 PROCEDURE — C1769 GUIDE WIRE: HCPCS | Performed by: UROLOGY

## 2017-01-31 PROCEDURE — 99217 ZZC OBSERVATION CARE DISCHARGE: CPT | Performed by: PHYSICIAN ASSISTANT

## 2017-01-31 PROCEDURE — 37000008 ZZH ANESTHESIA TECHNICAL FEE, 1ST 30 MIN: Performed by: UROLOGY

## 2017-01-31 PROCEDURE — 96361 HYDRATE IV INFUSION ADD-ON: CPT

## 2017-01-31 PROCEDURE — 25800025 ZZH RX 258: Performed by: UROLOGY

## 2017-01-31 PROCEDURE — 25000128 H RX IP 250 OP 636

## 2017-01-31 PROCEDURE — G0378 HOSPITAL OBSERVATION PER HR: HCPCS

## 2017-01-31 PROCEDURE — 85018 HEMOGLOBIN: CPT | Performed by: ANESTHESIOLOGY

## 2017-01-31 PROCEDURE — C1758 CATHETER, URETERAL: HCPCS | Performed by: UROLOGY

## 2017-01-31 PROCEDURE — 25000125 ZZHC RX 250

## 2017-01-31 PROCEDURE — 36415 COLL VENOUS BLD VENIPUNCTURE: CPT | Performed by: PHYSICIAN ASSISTANT

## 2017-01-31 PROCEDURE — 25000132 ZZH RX MED GY IP 250 OP 250 PS 637: Performed by: UROLOGY

## 2017-01-31 PROCEDURE — 96376 TX/PRO/DX INJ SAME DRUG ADON: CPT

## 2017-01-31 PROCEDURE — C2617 STENT, NON-COR, TEM W/O DEL: HCPCS | Performed by: UROLOGY

## 2017-01-31 PROCEDURE — 25000128 H RX IP 250 OP 636: Performed by: PHYSICIAN ASSISTANT

## 2017-01-31 PROCEDURE — 25000566 ZZH SEVOFLURANE, EA 15 MIN: Performed by: UROLOGY

## 2017-01-31 PROCEDURE — 36000056 ZZH SURGERY LEVEL 3 1ST 30 MIN: Performed by: UROLOGY

## 2017-01-31 PROCEDURE — 40000170 ZZH STATISTIC PRE-PROCEDURE ASSESSMENT II: Performed by: UROLOGY

## 2017-01-31 PROCEDURE — 25800025 ZZH RX 258: Performed by: ANESTHESIOLOGY

## 2017-01-31 PROCEDURE — 25000125 ZZHC RX 250: Performed by: UROLOGY

## 2017-01-31 PROCEDURE — 40000648 ZZH STATISTIC LITHOTRIPSY IDENTIFIER: Performed by: UROLOGY

## 2017-01-31 PROCEDURE — 36000058 ZZH SURGERY LEVEL 3 EA 15 ADDTL MIN: Performed by: UROLOGY

## 2017-01-31 PROCEDURE — 27210995 ZZH RX 272: Performed by: UROLOGY

## 2017-01-31 PROCEDURE — 27210794 ZZH OR GENERAL SUPPLY STERILE: Performed by: UROLOGY

## 2017-01-31 DEVICE — STENT URETERAL INLAY OPTIMA 4.7FRX24CM 788424: Type: IMPLANTABLE DEVICE | Site: URETER | Status: FUNCTIONAL

## 2017-01-31 RX ORDER — ACETAMINOPHEN 325 MG/1
650 TABLET ORAL EVERY 4 HOURS PRN
Status: DISCONTINUED | OUTPATIENT
Start: 2017-02-03 | End: 2017-01-31

## 2017-01-31 RX ORDER — ONDANSETRON 2 MG/ML
INJECTION INTRAMUSCULAR; INTRAVENOUS PRN
Status: DISCONTINUED | OUTPATIENT
Start: 2017-01-31 | End: 2017-01-31

## 2017-01-31 RX ORDER — ONDANSETRON 2 MG/ML
4 INJECTION INTRAMUSCULAR; INTRAVENOUS EVERY 30 MIN PRN
Status: DISCONTINUED | OUTPATIENT
Start: 2017-01-31 | End: 2017-01-31 | Stop reason: HOSPADM

## 2017-01-31 RX ORDER — NEOSTIGMINE METHYLSULFATE 1 MG/ML
VIAL (ML) INJECTION PRN
Status: DISCONTINUED | OUTPATIENT
Start: 2017-01-31 | End: 2017-01-31

## 2017-01-31 RX ORDER — ACETAMINOPHEN 325 MG/1
975 TABLET ORAL EVERY 8 HOURS
Status: DISCONTINUED | OUTPATIENT
Start: 2017-01-31 | End: 2017-01-31

## 2017-01-31 RX ORDER — OXYCODONE HYDROCHLORIDE 5 MG/1
5-10 TABLET ORAL
Status: DISCONTINUED | OUTPATIENT
Start: 2017-01-31 | End: 2017-01-31

## 2017-01-31 RX ORDER — PROPOFOL 10 MG/ML
INJECTION, EMULSION INTRAVENOUS CONTINUOUS PRN
Status: DISCONTINUED | OUTPATIENT
Start: 2017-01-31 | End: 2017-01-31

## 2017-01-31 RX ORDER — FUROSEMIDE 10 MG/ML
INJECTION INTRAMUSCULAR; INTRAVENOUS PRN
Status: DISCONTINUED | OUTPATIENT
Start: 2017-01-31 | End: 2017-01-31

## 2017-01-31 RX ORDER — OXYBUTYNIN CHLORIDE 5 MG/1
5 TABLET, EXTENDED RELEASE ORAL DAILY
Qty: 90 TABLET | Refills: 3 | Status: SHIPPED | OUTPATIENT
Start: 2017-01-31

## 2017-01-31 RX ORDER — FENTANYL CITRATE 0.05 MG/ML
25-50 INJECTION, SOLUTION INTRAMUSCULAR; INTRAVENOUS
Status: DISCONTINUED | OUTPATIENT
Start: 2017-01-31 | End: 2017-01-31 | Stop reason: HOSPADM

## 2017-01-31 RX ORDER — LIDOCAINE 40 MG/G
CREAM TOPICAL
Status: DISCONTINUED | OUTPATIENT
Start: 2017-01-31 | End: 2017-01-31 | Stop reason: HOSPADM

## 2017-01-31 RX ORDER — ACETAMINOPHEN 325 MG/1
650 TABLET ORAL EVERY 4 HOURS PRN
Status: DISCONTINUED | OUTPATIENT
Start: 2017-02-03 | End: 2017-01-31 | Stop reason: HOSPADM

## 2017-01-31 RX ORDER — ONDANSETRON 4 MG/1
4 TABLET, ORALLY DISINTEGRATING ORAL EVERY 30 MIN PRN
Status: DISCONTINUED | OUTPATIENT
Start: 2017-01-31 | End: 2017-01-31 | Stop reason: HOSPADM

## 2017-01-31 RX ORDER — KETOROLAC TROMETHAMINE 30 MG/ML
30 INJECTION, SOLUTION INTRAMUSCULAR; INTRAVENOUS EVERY 6 HOURS PRN
Status: DISCONTINUED | OUTPATIENT
Start: 2017-01-31 | End: 2017-01-31 | Stop reason: HOSPADM

## 2017-01-31 RX ORDER — OXYCODONE HYDROCHLORIDE 5 MG/1
5-10 TABLET ORAL
Qty: 20 TABLET | Refills: 0 | Status: SHIPPED | OUTPATIENT
Start: 2017-01-31 | End: 2017-02-19

## 2017-01-31 RX ORDER — ACETAMINOPHEN 325 MG/1
975 TABLET ORAL EVERY 8 HOURS
Status: DISCONTINUED | OUTPATIENT
Start: 2017-01-31 | End: 2017-01-31 | Stop reason: HOSPADM

## 2017-01-31 RX ORDER — NALOXONE HYDROCHLORIDE 0.4 MG/ML
.1-.4 INJECTION, SOLUTION INTRAMUSCULAR; INTRAVENOUS; SUBCUTANEOUS
Status: DISCONTINUED | OUTPATIENT
Start: 2017-01-31 | End: 2017-01-31 | Stop reason: HOSPADM

## 2017-01-31 RX ORDER — SODIUM CHLORIDE, SODIUM LACTATE, POTASSIUM CHLORIDE, CALCIUM CHLORIDE 600; 310; 30; 20 MG/100ML; MG/100ML; MG/100ML; MG/100ML
INJECTION, SOLUTION INTRAVENOUS CONTINUOUS
Status: CANCELLED | OUTPATIENT
Start: 2017-01-31

## 2017-01-31 RX ORDER — FENTANYL CITRATE 50 UG/ML
INJECTION, SOLUTION INTRAMUSCULAR; INTRAVENOUS PRN
Status: DISCONTINUED | OUTPATIENT
Start: 2017-01-31 | End: 2017-01-31

## 2017-01-31 RX ORDER — SODIUM CHLORIDE, SODIUM LACTATE, POTASSIUM CHLORIDE, CALCIUM CHLORIDE 600; 310; 30; 20 MG/100ML; MG/100ML; MG/100ML; MG/100ML
INJECTION, SOLUTION INTRAVENOUS CONTINUOUS
Status: DISCONTINUED | OUTPATIENT
Start: 2017-01-31 | End: 2017-01-31 | Stop reason: HOSPADM

## 2017-01-31 RX ORDER — ONDANSETRON 4 MG/1
4 TABLET, ORALLY DISINTEGRATING ORAL EVERY 6 HOURS PRN
Qty: 10 TABLET | Refills: 0 | Status: SHIPPED | OUTPATIENT
Start: 2017-01-31 | End: 2019-03-11

## 2017-01-31 RX ORDER — HYDROMORPHONE HYDROCHLORIDE 1 MG/ML
.3-.5 INJECTION, SOLUTION INTRAMUSCULAR; INTRAVENOUS; SUBCUTANEOUS EVERY 5 MIN PRN
Status: DISCONTINUED | OUTPATIENT
Start: 2017-01-31 | End: 2017-01-31 | Stop reason: HOSPADM

## 2017-01-31 RX ORDER — OXYCODONE HYDROCHLORIDE 5 MG/1
5-10 TABLET ORAL
Status: DISCONTINUED | OUTPATIENT
Start: 2017-01-31 | End: 2017-01-31 | Stop reason: HOSPADM

## 2017-01-31 RX ORDER — ONDANSETRON 2 MG/ML
4 INJECTION INTRAMUSCULAR; INTRAVENOUS EVERY 6 HOURS PRN
Status: DISCONTINUED | OUTPATIENT
Start: 2017-01-31 | End: 2017-01-31 | Stop reason: HOSPADM

## 2017-01-31 RX ORDER — FENTANYL CITRATE 50 UG/ML
25-50 INJECTION, SOLUTION INTRAMUSCULAR; INTRAVENOUS EVERY 5 MIN PRN
Status: DISCONTINUED | OUTPATIENT
Start: 2017-01-31 | End: 2017-01-31 | Stop reason: HOSPADM

## 2017-01-31 RX ORDER — PROPOFOL 10 MG/ML
INJECTION, EMULSION INTRAVENOUS PRN
Status: DISCONTINUED | OUTPATIENT
Start: 2017-01-31 | End: 2017-01-31

## 2017-01-31 RX ORDER — DEXAMETHASONE SODIUM PHOSPHATE 4 MG/ML
INJECTION, SOLUTION INTRA-ARTICULAR; INTRALESIONAL; INTRAMUSCULAR; INTRAVENOUS; SOFT TISSUE PRN
Status: DISCONTINUED | OUTPATIENT
Start: 2017-01-31 | End: 2017-01-31

## 2017-01-31 RX ORDER — MEPERIDINE HYDROCHLORIDE 25 MG/ML
12.5 INJECTION INTRAMUSCULAR; INTRAVENOUS; SUBCUTANEOUS EVERY 5 MIN PRN
Status: DISCONTINUED | OUTPATIENT
Start: 2017-01-31 | End: 2017-01-31 | Stop reason: HOSPADM

## 2017-01-31 RX ORDER — GLYCOPYRROLATE 0.2 MG/ML
INJECTION, SOLUTION INTRAMUSCULAR; INTRAVENOUS PRN
Status: DISCONTINUED | OUTPATIENT
Start: 2017-01-31 | End: 2017-01-31

## 2017-01-31 RX ORDER — LIDOCAINE HYDROCHLORIDE 20 MG/ML
INJECTION, SOLUTION INFILTRATION; PERINEURAL PRN
Status: DISCONTINUED | OUTPATIENT
Start: 2017-01-31 | End: 2017-01-31

## 2017-01-31 RX ORDER — ONDANSETRON 4 MG/1
4 TABLET, ORALLY DISINTEGRATING ORAL EVERY 6 HOURS PRN
Status: DISCONTINUED | OUTPATIENT
Start: 2017-01-31 | End: 2017-01-31 | Stop reason: HOSPADM

## 2017-01-31 RX ADMIN — KETOROLAC TROMETHAMINE 30 MG: 30 INJECTION, SOLUTION INTRAMUSCULAR at 10:51

## 2017-01-31 RX ADMIN — FENTANYL CITRATE 50 MCG: 50 INJECTION, SOLUTION INTRAMUSCULAR; INTRAVENOUS at 08:42

## 2017-01-31 RX ADMIN — HYDROMORPHONE HYDROCHLORIDE 0.5 MG: 1 INJECTION, SOLUTION INTRAMUSCULAR; INTRAVENOUS; SUBCUTANEOUS at 01:38

## 2017-01-31 RX ADMIN — PHENYLEPHRINE HYDROCHLORIDE 100 MCG: 10 INJECTION, SOLUTION INTRAMUSCULAR; INTRAVENOUS; SUBCUTANEOUS at 08:13

## 2017-01-31 RX ADMIN — FENTANYL CITRATE 50 MCG: 50 INJECTION, SOLUTION INTRAMUSCULAR; INTRAVENOUS at 08:25

## 2017-01-31 RX ADMIN — FENTANYL CITRATE 25 MCG: 50 INJECTION, SOLUTION INTRAMUSCULAR; INTRAVENOUS at 09:25

## 2017-01-31 RX ADMIN — PHENYLEPHRINE HYDROCHLORIDE 100 MCG: 10 INJECTION, SOLUTION INTRAMUSCULAR; INTRAVENOUS; SUBCUTANEOUS at 09:15

## 2017-01-31 RX ADMIN — GLYCOPYRROLATE 0.4 MG: 0.2 INJECTION, SOLUTION INTRAMUSCULAR; INTRAVENOUS at 09:45

## 2017-01-31 RX ADMIN — PROPOFOL 120 MG: 10 INJECTION, EMULSION INTRAVENOUS at 08:13

## 2017-01-31 RX ADMIN — FENTANYL CITRATE 50 MCG: 50 INJECTION, SOLUTION INTRAMUSCULAR; INTRAVENOUS at 08:13

## 2017-01-31 RX ADMIN — LIDOCAINE HYDROCHLORIDE 40 MG: 20 INJECTION, SOLUTION INFILTRATION; PERINEURAL at 08:13

## 2017-01-31 RX ADMIN — PHENYLEPHRINE HYDROCHLORIDE 100 MCG: 10 INJECTION, SOLUTION INTRAMUSCULAR; INTRAVENOUS; SUBCUTANEOUS at 09:05

## 2017-01-31 RX ADMIN — NEOSTIGMINE METHYLSULFATE 3 MG: 1 INJECTION INTRAMUSCULAR; INTRAVENOUS; SUBCUTANEOUS at 09:45

## 2017-01-31 RX ADMIN — FUROSEMIDE 10 MG: 10 INJECTION, SOLUTION INTRAVENOUS at 08:18

## 2017-01-31 RX ADMIN — PHENYLEPHRINE HYDROCHLORIDE 100 MCG: 10 INJECTION, SOLUTION INTRAMUSCULAR; INTRAVENOUS; SUBCUTANEOUS at 09:18

## 2017-01-31 RX ADMIN — ROCURONIUM BROMIDE 25 MG: 10 INJECTION INTRAVENOUS at 08:13

## 2017-01-31 RX ADMIN — FENTANYL CITRATE 25 MCG: 50 INJECTION, SOLUTION INTRAMUSCULAR; INTRAVENOUS at 09:27

## 2017-01-31 RX ADMIN — PROPOFOL 200 MCG/KG/MIN: 10 INJECTION, EMULSION INTRAVENOUS at 08:13

## 2017-01-31 RX ADMIN — SODIUM CHLORIDE, POTASSIUM CHLORIDE, SODIUM LACTATE AND CALCIUM CHLORIDE: 600; 310; 30; 20 INJECTION, SOLUTION INTRAVENOUS at 09:22

## 2017-01-31 RX ADMIN — ONDANSETRON 4 MG: 2 INJECTION INTRAMUSCULAR; INTRAVENOUS at 09:18

## 2017-01-31 RX ADMIN — PROPOFOL 30 MG: 10 INJECTION, EMULSION INTRAVENOUS at 09:12

## 2017-01-31 RX ADMIN — CEFAZOLIN SODIUM 2 G: 2 INJECTION, SOLUTION INTRAVENOUS at 08:25

## 2017-01-31 RX ADMIN — SODIUM CHLORIDE, POTASSIUM CHLORIDE, SODIUM LACTATE AND CALCIUM CHLORIDE: 600; 310; 30; 20 INJECTION, SOLUTION INTRAVENOUS at 06:53

## 2017-01-31 RX ADMIN — ACETAMINOPHEN 975 MG: 325 TABLET, FILM COATED ORAL at 13:02

## 2017-01-31 RX ADMIN — HYDROMORPHONE HYDROCHLORIDE 0.5 MG: 1 INJECTION, SOLUTION INTRAMUSCULAR; INTRAVENOUS; SUBCUTANEOUS at 06:07

## 2017-01-31 RX ADMIN — DEXAMETHASONE SODIUM PHOSPHATE 4 MG: 4 INJECTION, SOLUTION INTRAMUSCULAR; INTRAVENOUS at 08:18

## 2017-01-31 ASSESSMENT — PAIN DESCRIPTION - DESCRIPTORS: DESCRIPTORS: CONSTANT

## 2017-01-31 NOTE — PROCEDURES
[facility] Urology Brief Operative Note    Pre-operative diagnosis: Complex left sided stone disease:  Large left distal ureteral stone  Larger mulitple left renal calculi   Post-operative diagnosis: Same   Procedure: 1) Cysto with left retrograde ureteropyelogram  2) Left ureteroscopy with holmium laser lithotripsy  3) Left ureteral stone basket manipulation  4) Left DJ stent placement  5) Left renal ESWL   Surgeon: Aureliano Forrest MD   Assistant(s): None   Anesthesia: General mask   Estimated blood loss: None   Total IV fluids: (See anesthesia record)   Blood transfusion: No transfusion was given during surgery   Total urine output: (See anesthesia record)   Drains: Kline catheter   Specimens: Yes multiple Ca Ox stone fragments removed and shown to pt's mother.    Implants: None   Findings: Complex and multiple left sided stone disease.   Complications: None   Condition: Stable   Comments: See dictated operative report for full details

## 2017-01-31 NOTE — PLAN OF CARE
Problem: Goal Outcome Summary  Goal: Goal Outcome Summary  Outcome: No Change  A&Ox4. Up SBA. Regular diet, denies nausea. NPO at [place] for 8 am procedure with dr Forrest. VSS on RA. C/o 8-10/10 L flank pain, given dilaudid prn- sleeping in between doses. Up to BR, straining urine, no stone noted.

## 2017-01-31 NOTE — DISCHARGE SUMMARY
PRIMARY CARE PROVIDER:  None is particularly listed.        DATE OF ADMISSION:  1/30/2017.      DATE OF DISCHARGE:  1/31/2017.      DISCHARGE DIAGNOSES:   1.  Obstructive renal stone with noted left renal pelvis and proximal left ureter with moderate left hydronephrosis.      DISCHARGE MEDICATIONS:   1.  Zofran 4 mg ODT, 1 tablet under the tongue every 6 hours as needed for nausea.   2.  Ditropan-XL 5 mg tablet daily.   3.  Roxicodone 5 mg immediate release tablet, 1-2 tablets every 3 hours as needed for moderate to severe pain.   4.  Percocet 5/325 mg tablet 1-2 tablets every 4 hours as needed for pain.   5.  Tamsulosin 0.4 mg capsule daily.      MEDICATION CHANGES:     1.  Due to patient's noted multiple obstructing stones in the left renal pelvis and proximal left ureter with mild to moderate left hydronephrosis, measuring 0.7 cm the patient has been started on all medications as stated above.      ALLERGIES:  Amoxicillin.      DISPOSITION:  Home.      FOLLOWUP WITH RECOMMENDATIONS:     1.  The patient should follow up with primary care provider within 1 week.     2.  The patient should follow up with Dr. Forrest of [facility]rology [facility] on 02/23 at 2:00 p.m. for routine post-hospital check and stent removal.   3.  Patient will have a CT scan performed by [facility] at 10:00 in the morning on Wednesday, 2/22/2017.      ACTIVITY:  As tolerated.      DIET:  Regular diet.      CONSULTANTS:     1.  Urology.      LABORATORY, IMAGING AND PROCEDURES:   1.  Routine laboratory studies that included basic metabolic panel x2, HCG qualitative study, hemoglobin.   2.  Urinalysis with microscopic reflex with urine culture.   3.  CT abdomen and pelvis without contrast.   4.  Cystoscopy with left retrograde ureteral pyelogram with noted ureteroscopy Stella laser lithotripsy ureteral stone basket manipulation, double-J stent placement and renal extracorporeal shock wave lithotripsy performed.      PENDING RESULTS:  None.       PHYSICAL EXAMINATION ON DAY OF DISCHARGE:   VITAL SIGNS:  Temperature is 98.6 degrees Fahrenheit with a blood pressure of 97/56, heart rate of 87 beats per minute, respiratory rate of 18, O2 saturation 96% on room air.  The patient was denying any pain during my assessment.   HEENT:  Normocephalic, atraumatic.  Moist mucous membranes present.  No exudates noted in the posterior pharynx.  Uvula is midline.   NECK:  Supple, normal range of motion, no tracheal deviation, no cervical lymphadenopathy present.   CARDIOVASCULAR:  Regular rate and rhythm, no rubs, murmurs or gallops appreciated.   PULMONARY:  Lungs are clear to auscultation bilaterally, no wheezes, rhonchi or rales appreciated.   GASTROINTESTINAL:  Bowel sounds are present in all 4 quadrants, soft, nontender, nondistended.   NEUROLOGIC:  Cranial nerves II-XII are grossly intact.  The patient is seen moving all 4 extremities without any difficulty.   EXTREMITIES:  No lower extremity edema noted bilaterally.  Calves are nontender to palpation.      BRIEF HISTORY OF PRESENT ILLNESS:  Jess Hudson is a 51-year-old female with a past medical history significant for recurrent nephrolithiasis who was registered to observation due to obstructing left renal stones.      For full details, please read H&P dictated by physician assistant, Adriana Herrera.      HOSPITAL COURSE:   1.  Left-sided multiple obstructive renal stones noted to be in the renal pelvis and proximal left ureter with associated moderate left hydronephrosis:  Patient's symptoms began on the morning of 01/30 and presented to [facility] Emergency Department.  A CT of the abdomen and pelvis confirmed multiple obstructing stones in the left renal pelvis and proximal left ureter with mild to moderate left hydronephrosis.  The largest stone was measured at 0.7 cm.  Urinalysis showed red blood cells of 182 and calcium oxalate.  Otherwise, laboratory studies were unremarkable.  UA was  solange.  Urology Service was consulted and the patient underwent a cystoscopy with left ureteroscopy and lithotripsy.  The patient had a stent placed as well.  The patient will be discharging home with oral medications and scheduled Flomax and Ditropan-XL.  The patient will follow up with primary care provider and [facility]rology [facility].      CODE STATUS:  Full code.      The patient was discussed with Dr. Frias who agrees with discharge at this time.  Dr. Frias will evaluate the patient independently.      TOTAL TIME SPENT:  Total discharge time less than 30 minutes.         RUSLAN FRIAS MD       As dictated by LANCE STOREY PA-C            D: 2017 14:57   T: 2017 15:13   MT: [id number]      Name:     BIB KWAN   MRN:      [id number]        Account:        [id number]   :      1965           Admit Date:     [id number]                                  Discharge Date:       Document: [id number]

## 2017-01-31 NOTE — PROGRESS NOTES
Goals to be met before Discharge:    1) Urology evaluation - Met    2) Pain control - Not met    3) Surgery - Not met, scheduled for 0800 today

## 2017-01-31 NOTE — PROGRESS NOTES
Goals to be met before Discharge:    1) Urology evaluation - Met    2) Pain control - Not met    3) Surgery - Not met, scheduled for 0800 tomorrow

## 2017-01-31 NOTE — PLAN OF CARE
Problem: Goal Outcome Summary  Goal: Goal Outcome Summary  Outcome: No Change  Pt A+O. VSS with soft BPs. Lungs clear. Left flank pain managed with IV dilaudid. Up to bathroom with SBA/independnent. Voiding tea colored urine. Straining. No stones or sediments. NPO all night. Off unit for surgery at this time. No family at bedside but pt texted mom. Phone and glasses  in pt's bin. Other belongings left in room. Report given to pre- Op nurseKey.

## 2017-01-31 NOTE — ANESTHESIA CARE TRANSFER NOTE
Patient: Jess Hudson    COMBINED CYSTOSCOPY, URETEROSCOPY, LASER HOLMIUM LITHOTRIPSY URETER(S), INSERT STENT (Left Urethra)  Additional InformationProcedure(s):  CYSTOSCOPY, LEFT FLEXIBLE URETEROSCOPY,LASER HOLMIUM LITHOTRIPSY,LEFT STENT PLACEMENT,STONE EXTRACTION,LEFT RENAL ESWL ,LEFT DOUBLE J STENT - Wound Class: II-Clean Contaminated    Diagnosis: LEFT URETERAL STONE  Diagnosis Additional Information: No value filed.    Anesthesia Type:   General, ETT     Note:  Airway :Face Mask  Patient transferred to:PACU  Comments: Spontaneously breathing, sats 98 - 100%, vT 300-400, TOF 4/4 with sustained tetany. Head lift x 5 seconds, following commands with 100% O2 at 15 L/min, suctioned x2, anesthesiologist notified.  Extubated.  To PACU with O2 via SFM at 10 L/minute, VSS. Monitors on, report to RN.      Vitals: (Last set prior to Anesthesia Care Transfer)              Electronically Signed By: EDUARDO Vargas CRNA  January 31, 2017  10:08 AM

## 2017-01-31 NOTE — ANESTHESIA PREPROCEDURE EVALUATION
Anesthesia Evaluation     . Pt has had prior anesthetic.     No history of anesthetic complications     ROS/MED HX    ENT/Pulmonary:  - neg pulmonary ROS    (-) sleep apnea   Neurologic:       Cardiovascular:  - neg cardiovascular ROS       METS/Exercise Tolerance:  >4 METS   Hematologic:         Musculoskeletal:         GI/Hepatic:        (-) GERD   Renal/Genitourinary:     (+) chronic renal disease, type: CRI, Nephrolithiasis , Pt does not require dialysis,       Endo:         Psychiatric:         Infectious Disease:         Malignancy:         Other:               Physical Exam  Normal systems: dental    Airway   Mallampati: II  TM distance: >3 FB  Neck ROM: full    Dental     Cardiovascular   Rhythm and rate: regular and normal      Pulmonary    breath sounds clear to auscultation                    Anesthesia Plan      History & Physical Review  History and physical reviewed and following examination; no interval change.    ASA Status:  2 .    NPO Status:  > 8 hours    Plan for General and ETT with Intravenous induction. Maintenance will be Balanced.    PONV prophylaxis:  Ondansetron (or other 5HT-3) and Dexamethasone or Solumedrol       Postoperative Care      Consents  Anesthetic plan, risks, benefits and alternatives discussed with:  Patient..                          .

## 2017-01-31 NOTE — PROGRESS NOTES
Goals to be met before Discharge:    1) Urology evaluation - Met    2) Pain control - Met. Denies pain after procedure.    3) Surgery - Returned to Obs unit. Due to void.

## 2017-01-31 NOTE — PLAN OF CARE
Problem: Goal Outcome Summary  Goal: Goal Outcome Summary  Outcome: Adequate for Discharge Date Met:  01/31/17  A&Ox4. Pain managed with tylenol. Kline removed, pink output. Void 400cc output, blood tinged urine. Reported some mild discomfort with urination. Up SBA. Appetite is adequate, no nausea. Discharge instructions reviewed with pt, mother. Meds filled with pharmacy with pt, given prescription for zofran if decides she needs.

## 2017-01-31 NOTE — ANESTHESIA POSTPROCEDURE EVALUATION
Patient: Jess Hudson    COMBINED CYSTOSCOPY, URETEROSCOPY, LASER HOLMIUM LITHOTRIPSY URETER(S), INSERT STENT (Left Urethra)  Additional InformationProcedure(s):  CYSTOSCOPY, LEFT FLEXIBLE URETEROSCOPY,LASER HOLMIUM LITHOTRIPSY,LEFT STENT PLACEMENT,STONE EXTRACTION,LEFT RENAL ESWL ,LEFT DOUBLE J STENT - Wound Class: II-Clean Contaminated    Diagnosis:LEFT URETERAL STONE  Diagnosis Additional Information: No value filed.    Anesthesia Type:  General, ETT    Note:  Anesthesia Post Evaluation    Patient location during evaluation: PACU  Patient participation: Able to fully participate in evaluation  Level of consciousness: awake  Pain management: adequate  Airway patency: patent  Cardiovascular status: acceptable  Respiratory status: acceptable  Hydration status: acceptable  PONV: none     Anesthetic complications: None          Last vitals:  Filed Vitals:    01/31/17 1020 01/31/17 1030 01/31/17 1040   BP: 94/61 98/64 100/76   Pulse:      Temp:      Resp: 15 13 14   SpO2: 100% 100% 96%       Electronically Signed By: Kenia Joyner MD  January 31, 2017  10:51 AM

## 2017-01-31 NOTE — OP NOTE
DATE OF SERVICE:  01/31/2017.      PREOPERATIVE DIAGNOSIS:  Acute left renal colic secondary to complex left stone disease.      POSTOPERATIVE DIAGNOSES:   1.  Large left distal ureteral calculus.   2.  Larger yet multiple left renal calculi.      PROCEDURES:   1.  Cystoscopy, left retrograde ureteropyelogram.   2.  Left ureteroscopy with holmium laser lithotripsy.   3.  Left ureteral stone basket manipulation.   4.  Left double-J stent placement.   5.  Left renal extracorporeal shock wave lithotripsy.      ANESTHESIA:  General.      BLOOD LOSS:  Zero.      DESCRIPTION OF PROCEDURE:  Jess Hudson was prepped and draped in the dorsal lithotomy position at the end of the [employer] lithotripter table using C-arm fluoroscopy and a multidisciplinary timeout observed (laterality left side).  A 22-Swedish cystoscope was passed per meatus and into the bladder.  There was no evidence of vesical calculi.  On intubation, the patient was given 10 mg of Lasix.  Vigorous effluence from the right ureteral orifice was visualized.  The left ureteral orifice which was orthotopic was not demonstrating any effluence of urine.      Using a 6-Swedish tapered ureteral catheter, I passed it per meatus and shot a limited retrograde ureteropyelogram, which demonstrated a filling defect in the distal ureter of at least 5-6 mm in size.  I then threaded a stiff Glidewire around this known ureteral stone and advanced it into the upper collecting system.  The previously identified calculi which were located in the left renal pelvis were now observed on C-arm fluoroscopy to be actually out of the UPJ and settled somewhat in the lower portion of the pelvis.  They were nonobstructing at this point.  I then passed a second guidewire using a dual-lumen ureteral catheter.  The second wire was a Super Stiff Amplatz wire.      Coiling the stiff Glidewire on the patient's right thigh, I then used the Super Stiff Amplatz wire to guide my semirigid 6.9-Swedish  Stortz cystoscope through the urethral meatus, cannulating the left ureteral orifice with ease and passing the scope up to the filling defect, which was a classic-appearing calcium oxalate spiculated round stone.  It was described as 3 mm on CT imaging yesterday, but the stone was clearly larger than that, likely 6-7 cm in size.      Using a 365 micron holmium laser fiber, using the stone dusting settings, the stone was broken into approximately 45-50 pieces.  The largest fragments were then extracted with a 3-Thai Sierra basket.  A small tip of the laser fiber was noted to have broken off at the end of the fragmentation and then I was able to extract this with the stone basket and out of the ureter and out of the bladder.      The stone fragments were collected and put into a container, which I showed to the patient's mother.      Over the preexisting remaining Glidewire, I passed a 4.7-Thai x 24 cm double-J stent, the proximal end coiling nicely in the left renal pelvis, the distal end in the bladder.      A Kline catheter was placed, B&O suppository given to the patient and then the patient was repositioned on the [employer] lithotripter table in the supine.  All pressure points were padded.      Starting off first at a low watt setting and treated to 200 shocks, we then observed over a 2-minute period.  Before restarting shock wave lithotripsy, we went up to power settings as high as 7, within 2500 shocks, the stone was difficult to see.  Fragmentation occurred early and it appears to be complete.      The indwelling double-J stent on the patient's left side will be left in place for 10 days to 2 weeks and then will see her back in my office for imaging and possible removal in the office.      Blood loss was 0.  Patient tolerated the procedure well, left the operating room in stable condition.         VAISAHLI COX MD             D: 01/31/2017 09:44   T: 01/31/2017 12:28   MT: TS      Name:     BIB KWAN    MRN:      [id number]        Account:        [id number]   :      1965           Procedure Date: 2017      Document: [id number]       cc: Aureliano Forrest MD

## 2017-02-01 LAB
BACTERIA SPEC CULT: ABNORMAL
Lab: ABNORMAL
MICRO REPORT STATUS: ABNORMAL
SPECIMEN SOURCE: ABNORMAL

## 2017-02-19 ENCOUNTER — APPOINTMENT (OUTPATIENT)
Dept: CT IMAGING | Facility: CLINIC | Age: 52
End: 2017-02-19
Attending: PHYSICIAN ASSISTANT
Payer: COMMERCIAL

## 2017-02-19 ENCOUNTER — APPOINTMENT (OUTPATIENT)
Dept: ULTRASOUND IMAGING | Facility: CLINIC | Age: 52
End: 2017-02-19
Attending: PHYSICIAN ASSISTANT
Payer: COMMERCIAL

## 2017-02-19 ENCOUNTER — HOSPITAL ENCOUNTER (EMERGENCY)
Facility: CLINIC | Age: 52
Discharge: HOME | End: 2017-02-19
Attending: PHYSICIAN ASSISTANT | Admitting: PHYSICIAN ASSISTANT
Payer: COMMERCIAL

## 2017-02-19 VITALS
WEIGHT: 125 LBS | OXYGEN SATURATION: 97 % | SYSTOLIC BLOOD PRESSURE: 112 MMHG | RESPIRATION RATE: 16 BRPM | HEIGHT: 65 IN | DIASTOLIC BLOOD PRESSURE: 80 MMHG | BODY MASS INDEX: 20.83 KG/M2 | TEMPERATURE: 98.3 F

## 2017-02-19 DIAGNOSIS — N39.0 URINARY TRACT INFECTION WITHOUT HEMATURIA, SITE UNSPECIFIED: ICD-10-CM

## 2017-02-19 DIAGNOSIS — R10.9 LEFT FLANK PAIN: ICD-10-CM

## 2017-02-19 DIAGNOSIS — R79.89 ELEVATED LIVER FUNCTION TESTS: ICD-10-CM

## 2017-02-19 LAB
ALBUMIN SERPL-MCNC: 3.1 G/DL (ref 3.4–5)
ALBUMIN UR-MCNC: >=300 MG/DL
ALP SERPL-CCNC: 98 U/L (ref 40–150)
ALT SERPL W P-5'-P-CCNC: 76 U/L (ref 0–50)
ANION GAP SERPL CALCULATED.3IONS-SCNC: 9 MMOL/L (ref 3–14)
APPEARANCE UR: ABNORMAL
AST SERPL W P-5'-P-CCNC: 55 U/L (ref 0–45)
BACTERIA #/AREA URNS HPF: ABNORMAL /HPF
BASOPHILS # BLD AUTO: 0.1 10E9/L (ref 0–0.2)
BASOPHILS NFR BLD AUTO: 0.6 %
BILIRUB SERPL-MCNC: 0.3 MG/DL (ref 0.2–1.3)
BILIRUB UR QL STRIP: ABNORMAL
BUN SERPL-MCNC: 13 MG/DL (ref 7–30)
CALCIUM SERPL-MCNC: 9 MG/DL (ref 8.5–10.1)
CAOX CRY #/AREA URNS HPF: ABNORMAL /HPF
CHLORIDE SERPL-SCNC: 103 MMOL/L (ref 94–109)
CO2 SERPL-SCNC: 26 MMOL/L (ref 20–32)
COLOR UR AUTO: ABNORMAL
CREAT SERPL-MCNC: 0.62 MG/DL (ref 0.52–1.04)
DIFFERENTIAL METHOD BLD: NORMAL
EOSINOPHIL # BLD AUTO: 0.6 10E9/L (ref 0–0.7)
EOSINOPHIL NFR BLD AUTO: 7.2 %
ERYTHROCYTE [DISTWIDTH] IN BLOOD BY AUTOMATED COUNT: 12.9 % (ref 10–15)
GFR SERPL CREATININE-BSD FRML MDRD: ABNORMAL ML/MIN/1.7M2
GLUCOSE SERPL-MCNC: 101 MG/DL (ref 70–99)
GLUCOSE UR STRIP-MCNC: NEGATIVE MG/DL
HCT VFR BLD AUTO: 40.7 % (ref 35–47)
HGB BLD-MCNC: 13.7 G/DL (ref 11.7–15.7)
HGB UR QL STRIP: ABNORMAL
IMM GRANULOCYTES # BLD: 0 10E9/L (ref 0–0.4)
IMM GRANULOCYTES NFR BLD: 0.2 %
KETONES UR STRIP-MCNC: ABNORMAL MG/DL
LEUKOCYTE ESTERASE UR QL STRIP: ABNORMAL
LIPASE SERPL-CCNC: 107 U/L (ref 73–393)
LYMPHOCYTES # BLD AUTO: 2.3 10E9/L (ref 0.8–5.3)
LYMPHOCYTES NFR BLD AUTO: 28 %
MCH RBC QN AUTO: 32 PG (ref 26.5–33)
MCHC RBC AUTO-ENTMCNC: 33.7 G/DL (ref 31.5–36.5)
MCV RBC AUTO: 95 FL (ref 78–100)
MONOCYTES # BLD AUTO: 0.9 10E9/L (ref 0–1.3)
MONOCYTES NFR BLD AUTO: 10.7 %
NEUTROPHILS # BLD AUTO: 4.4 10E9/L (ref 1.6–8.3)
NEUTROPHILS NFR BLD AUTO: 53.3 %
NITRATE UR QL: NEGATIVE
NON-SQ EPI CELLS #/AREA URNS LPF: ABNORMAL /LPF
NRBC # BLD AUTO: 0 10*3/UL
NRBC BLD AUTO-RTO: 0 /100
PH UR STRIP: 6 PH (ref 5–7)
PLATELET # BLD AUTO: 179 10E9/L (ref 150–450)
POTASSIUM SERPL-SCNC: 4 MMOL/L (ref 3.4–5.3)
PROT SERPL-MCNC: 7.5 G/DL (ref 6.8–8.8)
RBC # BLD AUTO: 4.28 10E12/L (ref 3.8–5.2)
RBC #/AREA URNS AUTO: >100 /HPF (ref 0–2)
SODIUM SERPL-SCNC: 138 MMOL/L (ref 133–144)
SP GR UR STRIP: 1.02 (ref 1–1.03)
URN SPEC COLLECT METH UR: ABNORMAL
UROBILINOGEN UR STRIP-ACNC: 1 EU/DL (ref 0.2–1)
WBC # BLD AUTO: 8.2 10E9/L (ref 4–11)
WBC #/AREA URNS AUTO: ABNORMAL /HPF (ref 0–2)

## 2017-02-19 PROCEDURE — 87086 URINE CULTURE/COLONY COUNT: CPT | Performed by: PHYSICIAN ASSISTANT

## 2017-02-19 PROCEDURE — 81001 URINALYSIS AUTO W/SCOPE: CPT | Performed by: PHYSICIAN ASSISTANT

## 2017-02-19 PROCEDURE — 85025 COMPLETE CBC W/AUTO DIFF WBC: CPT | Performed by: PHYSICIAN ASSISTANT

## 2017-02-19 PROCEDURE — 83690 ASSAY OF LIPASE: CPT | Performed by: PHYSICIAN ASSISTANT

## 2017-02-19 PROCEDURE — 99285 EMERGENCY DEPT VISIT HI MDM: CPT | Mod: 25

## 2017-02-19 PROCEDURE — 25000128 H RX IP 250 OP 636: Performed by: PHYSICIAN ASSISTANT

## 2017-02-19 PROCEDURE — 96374 THER/PROPH/DIAG INJ IV PUSH: CPT

## 2017-02-19 PROCEDURE — 80053 COMPREHEN METABOLIC PANEL: CPT | Performed by: PHYSICIAN ASSISTANT

## 2017-02-19 PROCEDURE — 74176 CT ABD & PELVIS W/O CONTRAST: CPT

## 2017-02-19 PROCEDURE — 76705 ECHO EXAM OF ABDOMEN: CPT

## 2017-02-19 RX ORDER — HYDROMORPHONE HYDROCHLORIDE 1 MG/ML
0.5 INJECTION, SOLUTION INTRAMUSCULAR; INTRAVENOUS; SUBCUTANEOUS
Status: COMPLETED | OUTPATIENT
Start: 2017-02-19 | End: 2017-02-19

## 2017-02-19 RX ORDER — CEPHALEXIN 500 MG/1
500 CAPSULE ORAL 3 TIMES DAILY
Qty: 21 CAPSULE | Refills: 0 | Status: SHIPPED | OUTPATIENT
Start: 2017-02-19 | End: 2017-02-26

## 2017-02-19 RX ORDER — OXYCODONE HYDROCHLORIDE 5 MG/1
5 TABLET ORAL EVERY 6 HOURS PRN
Qty: 12 TABLET | Refills: 0 | Status: SHIPPED | OUTPATIENT
Start: 2017-02-19

## 2017-02-19 RX ADMIN — HYDROMORPHONE HYDROCHLORIDE 0.5 MG: 1 INJECTION, SOLUTION INTRAMUSCULAR; INTRAVENOUS; SUBCUTANEOUS at 16:18

## 2017-02-19 ASSESSMENT — ENCOUNTER SYMPTOMS
FEVER: 0
DYSURIA: 0
FLANK PAIN: 1
NAUSEA: 1
ABDOMINAL PAIN: 0
VOMITING: 1

## 2017-02-19 NOTE — ED AVS SNAPSHOT
Emergency Department    [address] [place] [id number]    Phone:  [phone]    Fax:  [phone]                                       Jess Hudson   MRN: [id number]    Department:   Emergency Department   Date of Visit:  2/19/2017           After Visit Summary Signature Page     I have received my discharge instructions, and my questions have been answered. I have discussed any challenges I see with this plan with the nurse or doctor.    ..........................................................................................................................................  Patient/Patient Representative Signature      ..........................................................................................................................................  Patient Representative Print Name and Relationship to Patient    ..................................................               ................................................  Date                                            Time    ..........................................................................................................................................  Reviewed by Signature/Title    ...................................................              ..............................................  Date                                                            Time

## 2017-02-19 NOTE — ED PROVIDER NOTES
"  History     Chief Complaint:  Flank Pain    HPI   Jess Hudson is a 51 year old female with a history of kidney stones who presents with flank pain, two weeks post ureteral stent placement due to obstructing ureteral stone. She states that for the past week she has had increasing left flank pain, which is greater than what she expected from her stent placement. Her pain has been accompanied by nausea and one episode of emesis approximately five days ago. She has been given tramadol for the pain, though states that this has not been helping. There is some mild dysuria. She denies fevers, abdominal pain, diarrhea, constipation or hematuria.    Allergies:  Amoxicillin     Medications:    oxybutynin (DITROPAN XL) 5 MG 24 hr tablet   ondansetron (ZOFRAN ODT) 4 MG ODT tab     Past Medical History:    Kidney stones    Past Surgical History:    Appendectomy  Breast surgery  Cystoscopy  Extracorporeal lithotripsy  Laser holmium lithotripsy    Family History:    History reviewed. No pertinent family history.     Social History:  The patient presented to the ED alone.   Smoking Status: Former smoker  Smokeless Tobacco: No  Alcohol Use: No   Marital Status:  Single [1]     Review of Systems   Constitutional: Negative for fever.   Gastrointestinal: Positive for nausea and vomiting. Negative for abdominal pain.   Genitourinary: Positive for flank pain. Negative for dysuria.   All other systems reviewed and are negative.    Physical Exam   Vitals:  Patient Vitals for the past 24 hrs:   BP Temp Temp src Heart Rate Resp SpO2 Height Weight   02/19/17 1914 112/80 - - - - - - -   02/19/17 1622 - - - - - 97 % - -   02/19/17 1620 115/77 - - - - - - -   02/19/17 1503 137/88 98.3  F (36.8  C) Oral 82 16 97 % 1.651 m (5' 5\") 56.7 kg (125 lb)     Physical Exam  Nursing note and vitals reviewed.     GENERAL: Alert, mild distress, non toxic appearing.   HEENT: Normal conjunctiva. No scleral icterus. MMM.   NECK: Supple.  CARDIAC: Normal " rate and regular rhythm. Normal heart sounds. No murmurs, rubs, or gallops appreciated.  PULMONARY: CTA bilaterally. Normal breath sounds. No wheezing, crackles, or rhonchi appreciated.  ABDOMEN: Soft, non distended abdomen. Non-tender. No rebound or guarding.   NEURO: Alert and oriented. Non-focal.   MUSCULOSKELETAL: Normal range of motion. No peripheral edema. Left CVA tenderness.   SKIN: Skin is warm and dry. No rashes. No pallor or jaundice.   PSYCH: Normal affect and mood.     Emergency Department Course     Imaging:  Radiology findings were communicated with the patient who voiced understanding of the findings.  CT Abdomen/Pelvis:  1. Ureteral stent in place on the left with mild hydronephrosis. The  hydronephrosis is improved compared to previous and the previously  seen stones are not seen today.  2. Possible complex fluid in the right abdomen which could indicate  some intra-abdominal bleeding. Ultrasound could be performed to  evaluate for complex fluid in the right flank. Less likely this could  represent unopacified bowel.   Reading per radiology.     US Abdomen:   No fluid is seen in the right abdomen, loops of bowel are  noted. Therefore the finding on CT is therefore unopacified bowel.  Reading per radiology.     Laboratory:  Laboratory findings were communicated with the patient who voiced understanding of the findings.  CBC: AWNL. (WBC 8.2, HGB 13.7, )   CMP: Glucose: 101 (H), ALT: 76 (H), AST: 55 (H), Albumin: 3.1 (L) o/w WNL (Creatinine 0.62)  Lipase: 107  UA: Bilin: Small (A), Ketones: Trace (A), Blood: Large (A), Albumin: >300 (A), Leukocyte Esterase: Small (A), WBC/HPF:  (A), RBC/HPF: >100 (A), Squamous Epithelial: Moderate (A), Bacteria: Moderate (A), Calcium Oxalate: Moderate (A) o/w WNL    Interventions:  1618 Dilaudid 0.5 mg IV     Emergency Department Course:  Nursing notes and vitals reviewed.  I performed an exam of the patient as documented above.   IV was inserted and  blood was drawn for laboratory testing, results above.  The patient was sent for a CT abdomen/pelvis and an abdominal ultrasound while in the emergency department, results above.  The patient provided a urine sample here in the emergency department. This was sent for laboratory testing, findings above.    6:50 PM: I spoke with Dr. Crespo of the urology service regarding patient's presentation, findings, and plan of care.   The patient was updated on the results of her lab and imaging studies. We discussed my conversation with urology.    I discussed the treatment plan with the patient. They expressed understanding of this plan and consented to discharge. They will be discharged home with instructions for care and follow up. In addition, the patient will return to the emergency department if their symptoms persist, worsen, if new symptoms arise or if there is any concern.  All questions were answered.    I personally reviewed the laboratory results with the Patient and answered all related questions prior to discharge.    Impression & Plan      Medical Decision Making:  Jess Hudson is a 51 year old female with a history of obstructing left ureteral stone, status post stent placement on 1/31 who presents to the emergency department today with concern for worsening left flank pain over the past few days. She does report mild dysuria. She is afebrile, hemodynamically stable and nontoxic appearing with left sided CVA tenderness on exam. Given the worsening pain I did feel repeat CT scan was indicated. This shows the ureteral stent in place on the left with mild hydronephrosis which has actually improved since previous study. The prior kidney stones are not seen on today's exam, suggesting that they may have been passed. Incidentally, there was a possible complex fluid collection in the right abdomen which could indicate some intraabdominal bleeding versus unopacified bowel. She has no tenderness on the right side of  her abdomen or flank. Because of this finding, ultrasound was performed which showed no signs of fluid in the right abdomen. This did show loops of bowel, therefore CT finding consistent with unopacified bowel. White count within normal limits. No evidence of acute metabolic derangement. LFT's slightly elevated, but she has not RUQ tenderness to suggest acute hepatobiliary disease. Lipase within normal limits making pancreatitis unlikely. Urine was contaminated however does show bacteria and signs of infection. Given she did have some dysuria, will start her on an antibiotic to cover for urinary tract infection. Urine culture pending. I discussed the patient with Dr. Crespo of urology who felt that patient no longer needed follow up CT scan that she was scheduled for in two days. She is scheduled to see urology on Thursday for stent removal. He felt she is likely suffering from stent pain and this will improve following stent removal. In the interim we will provide her prescription for oxycodone for pain control and she can also use ibuprofen or tylenol. Advised her to follow up with PCP to have LFTs rechecked. Reviewed reasons to return to ED, including worsening symptoms, fevers, abdominal pain, or any new concerns. The patient was in agreement with plan and discharged in satisfactory condition with all questions answered.      Diagnosis:    ICD-10-CM    1. Left flank pain s/p stent placement on 1/31 due to obstructive renal stone R10.9 Urine Culture   2. Urinary tract infection without hematuria, site unspecified N39.0    3. Elevated liver function tests, minimally increased R79.89       Disposition:   Discharge to home    Discharge Medications:  Discharge Medication List as of 2/19/2017  7:07 PM      START taking these medications    Details   cephALEXin (KEFLEX) 500 MG capsule Take 1 capsule (500 mg) by mouth 3 times daily for 7 days, Disp-21 capsule, R-0, Local Print           Scribe Disclosure:  Roberto Carlos RAM  David, am serving as a scribe at 3:51 PM on 2/19/2017 to document services personally performed by Adriane Bower PA, based on my observations and the provider's statements to me.   2/19/2017    EMERGENCY DEPARTMENT       Adriane Bower PA-C  02/19/17 9102

## 2017-02-19 NOTE — ED AVS SNAPSHOT
Emergency Department    [address] [facility] [address]    Phone:  [phone]    Fax:  [phone]                                       Jess Hudson   MRN: [id number]    Department:   Emergency Department   Date of Visit:  2/19/2017           Patient Information     Date Of Birth          1965        Your diagnoses for this visit were:     Left flank pain s/p stent placement on 1/31 due to obstructive renal stone     Urinary tract infection without hematuria, site unspecified     Elevated liver function tests, minimally increased        You were seen by Adriane Bower PA-C.      Follow-up Information     Follow up with  Emergency Department.    Specialty:  EMERGENCY MEDICINE    Why:  If symptoms worsen    Contact information:    [address] [facility] [address] [phone]        Follow up with Aureliano oFrrest MD In 4 days.    Specialty:  Urology    Contact information:    [facility]  [address] [facility] [place] [address] [address] [phone]          Discharge Instructions       Rest, fluids, ibuprofen for pain, oxycodone for severe pain.   Start antibiotic. Will culture urine.   Cancel CT scan on Tuesday.   Follow up on Thursday as scheduled.   Return for worsening symptoms, high fevers, abdominal pain or any other new concerns.     *repeat liver function tests with primary care provider    Discharge Instructions  Urinary Tract Infection  You have urinary tract infection, or UTI. The urinary tract includes the kidneys (which make urine), ureters (the tubes that carry urine from the kidneys to the bladder), the bladder (which stores urine), and urethra (the tube that carries urine out of the bladder).  Urinary tract infections occur when bacteria travel up the urethra into the bladder. We suspect a UTI based on chemical and microscopic findings in your urine, but if there is a question about your findings, we will do a culture to  see if bacteria grow. A urine culture takes several days. You should always follow-up with your primary physician to find out about results of your culture if one was done.   Return to the Emergency Department if:    You have severe back pain.    You are vomiting so that you can t take your medicine, or have signs of dehydration (such as urinating less than 3 times per day).    You have fever over 101.5 degrees F.    You have significant confusion or are very weak, or feel very ill.    Your child seems much more ill, won t wake up, won t respond right, or is crying for a long time and won t calm down.    Your child is showing signs of dehydration, Signs of dehydration can be:  o Your infant has had no wet diapers in 4-5 hours.  o Your older child has not passed urine in 6-8 hours.  o Your infant or child starts to have dry mouth and lips, or no saliva or tears.    Follow-up with your doctor:     Children under 24 months need to be seen by their regular doctor within one week after a diagnosis of a UTI. It may be necessary to do some imaging tests to look at the child s kidney or bladder.    You should begin to feel better within 24 - 48 hours of starting your antibiotic.  If you do not, you need to be seen again.      Treatment:     You will be treated with an antibiotic to kill the bacteria. We have to make an educated guess as to which antibiotic will work for your infection. In most healthy people, we can guess right almost all of the time. Sometimes a culture is done to show which antibiotics will work. This usually takes 2-3 days. When the culture is done, we may have to contact you to put you on a different antibiotic.    Take a pain medication such as Tylenol  (acetaminophen), Advil  (ibuprofen), Nuprin  (ibuprofen), or Aleve  (naproxen). If you have been given a narcotic such as Vicodin  (hydrocodone with acetaminophen), Percocet  (oxycodone with acetaminophen), or codeine, do not drive for four hours after  "you have taken it. If the narcotic contains Tylenol  (acetaminophen), do not take Tylenol  with it. All narcotics will cause constipation, so eat a high fiber diet.      Pyridium  (phenazopyridine) or Uristat  (phenazopyridine) is a prescription medication that numbs the bladder to reduce the burning pain of some UTIs.  The same medication is available in a non-prescription version called Azo-Standard  (phenazopyridine), Urodol  (phenazopyridine), or other brand names. This medication will change the color of the urine and tears (usually blue or orange). If you wear contacts, do not wear them while taking this medication as they may be stained by the medication.    Antibiotic Warning:     If you have been placed on antibiotics - watch for signs of allergic reaction.  These include rash, lip swelling, difficulty breathing, wheezing, and dizziness.  If you develop any of these symptoms, stop the antibiotic immediately and go to an emergency room or urgent care for evaluation.    Probiotics: If you have been given an antibiotic, you may want to also take a probiotic pill or eat yogurt with live cultures. Probiotics have \"good bacteria\" to help your intestines stay healthy. Studies have shown that probiotics help prevent diarrhea and other intestine problems (including C. diff infection) when you take antibiotics. You can buy these without a prescription in the pharmacy section of the store.   If you were given a prescription for medicine here today, be sure to read all of the information (including the package insert) that comes with your prescription.  This will include important information about the medicine, its side effects, and any warnings that you need to know about.  The pharmacist who fills the prescription can provide more information and answer questions you may have about the medicine.  If you have questions or concerns that the pharmacist cannot address, please call or return to the Emergency Department. "   Opioid Medication Information    Pain medications are among the most commonly prescribed medicines, so we are including this information for all our patients. If you did not receive pain medication or get a prescription for pain medicine, you can ignore it.     You may have been given a prescription for an opioid (narcotic) pain medicine and/or have received a pain medicine while here in the Emergency Department. These medicines can make you drowsy or impaired. You must not drive, operate dangerous equipment, or engage in any other dangerous activities while taking these medications. If you drive while taking these medications, you could be arrested for DUI, or driving under the influence. Do not drink any alcohol while you are taking these medications.     Opioid pain medications can cause addiction. If you have a history of chemical dependency of any type, you are at a higher risk of becoming addicted to pain medications.  Only take these prescribed medications to treat your pain when all other options have been tried. Take it for as short a time and as few doses as possible. Store your pain pills in a secure place, as they are frequently stolen and provide a dangerous opportunity for children or visitors in your house to start abusing these powerful medications. We will not replace any lost or stolen medicine.  As soon as your pain is better, you should flush all your remaining medication.     Many prescription pain medications contain Tylenol  (acetaminophen), including Vicodin , Tylenol #3 , Norco , Lortab , and Percocet .  You should not take any extra pills of Tylenol  if you are using these prescription medications or you can get very sick.  Do not ever take more than 3000 mg of acetaminophen in any 24 hour period.    All opioids tend to cause constipation. Drink plenty of water and eat foods that have a lot of fiber, such as fruits, vegetables, prune juice, apple juice and high fiber cereal.  Take a  laxative if you don t move your bowels at least every other day. Miralax , Milk of Magnesia, Colace , or Senna  can be used to keep you regular.      Remember that you can always come back to the Emergency Department if you are not able to see your regular doctor in the amount of time listed above, if you get any new symptoms, or if there is anything that worries you.        24 Hour Appointment Hotline       To make an appointment at any [facility] call [phone] ([phone]). If you don't have a family doctor or clinic, we will help you find one. [place] clinics are conveniently located to serve the needs of you and your family.             Review of your medicines      START taking        Dose / Directions Last dose taken    cephALEXin 500 MG capsule   Commonly known as:  KEFLEX   Dose:  500 mg   Quantity:  21 capsule        Take 1 capsule (500 mg) by mouth 3 times daily for 7 days   Refills:  0          CONTINUE these medicines which may have CHANGED, or have new prescriptions. If we are uncertain of the size of tablets/capsules you have at home, strength may be listed as something that might have changed.        Dose / Directions Last dose taken    oxyCODONE 5 MG IR tablet   Commonly known as:  ROXICODONE   Dose:  5 mg   What changed:    - how much to take  - when to take this  - reasons to take this   Quantity:  12 tablet        Take 1 tablet (5 mg) by mouth every 6 hours as needed for pain   Refills:  0          Our records show that you are taking the medicines listed below. If these are incorrect, please call your family doctor or clinic.        Dose / Directions Last dose taken    ondansetron 4 MG ODT tab   Commonly known as:  ZOFRAN ODT   Dose:  4 mg   Quantity:  10 tablet        Place 1 tablet (4 mg) under the tongue every 6 hours as needed for nausea   Refills:  0        oxybutynin 5 MG 24 hr tablet   Commonly known as:  DITROPAN XL   Dose:  5 mg   Quantity:  90 tablet        Take 1  tablet (5 mg) by mouth daily   Refills:  3                Prescriptions were sent or printed at these locations (2 Prescriptions)                   Other Prescriptions                Printed at Department/Unit printer (2 of 2)         oxyCODONE (ROXICODONE) 5 MG IR tablet               cephALEXin (KEFLEX) 500 MG capsule                Procedures and tests performed during your visit     *UA reflex to Microscopic (ED Lab POCT Only 3-11)    Abd/pelvis CT no contrast - Stone Protocol    CBC with platelets differential    Comprehensive metabolic panel    Lipase    US Abdomen Limited    Urine Culture    Urine Microscopic      Orders Needing Specimen Collection     None      Pending Results     Date and Time Order Name Status Description    2/19/2017 1847 Urine Culture In process             Pending Culture Results     Date and Time Order Name Status Description    2/19/2017 1847 Urine Culture In process              Test Results from your hospital stay     2/19/2017  4:20 PM - Interface, easy2comply ([employer]) Results      Component Results     Component Value Ref Range & Units Status    WBC 8.2 4.0 - 11.0 10e9/L Final    RBC Count 4.28 3.8 - 5.2 10e12/L Final    Hemoglobin 13.7 11.7 - 15.7 g/dL Final    Hematocrit 40.7 35.0 - 47.0 % Final    MCV 95 78 - 100 fl Final    MCH 32.0 26.5 - 33.0 pg Final    MCHC 33.7 31.5 - 36.5 g/dL Final    RDW 12.9 10.0 - 15.0 % Final    Platelet Count 179 150 - 450 10e9/L Final    Diff Method Automated Method  Final    % Neutrophils 53.3 % Final    % Lymphocytes 28.0 % Final    % Monocytes 10.7 % Final    % Eosinophils 7.2 % Final    % Basophils 0.6 % Final    % Immature Granulocytes 0.2 % Final    Nucleated RBCs 0 0 /100 Final    Absolute Neutrophil 4.4 1.6 - 8.3 10e9/L Final    Absolute Lymphocytes 2.3 0.8 - 5.3 10e9/L Final    Absolute Monocytes 0.9 0.0 - 1.3 10e9/L Final    Absolute Eosinophils 0.6 0.0 - 0.7 10e9/L Final    Absolute Basophils 0.1 0.0 - 0.2 10e9/L Final    Abs Immature Granulocytes  0.0 0 - 0.4 10e9/L Final    Absolute Nucleated RBC 0.0  Final         2/19/2017  4:40 PM - Interface, Flexilab Results      Component Results     Component Value Ref Range & Units Status    Sodium 138 133 - 144 mmol/L Final    Potassium 4.0 3.4 - 5.3 mmol/L Final    Chloride 103 94 - 109 mmol/L Final    Carbon Dioxide 26 20 - 32 mmol/L Final    Anion Gap 9 3 - 14 mmol/L Final    Glucose 101 (H) 70 - 99 mg/dL Final    Urea Nitrogen 13 7 - 30 mg/dL Final    Creatinine 0.62 0.52 - 1.04 mg/dL Final    GFR Estimate >90  Non  GFR Calc   >60 mL/min/1.7m2 Final    GFR Estimate If Black >90   GFR Calc   >60 mL/min/1.7m2 Final    Calcium 9.0 8.5 - 10.1 mg/dL Final    Bilirubin Total 0.3 0.2 - 1.3 mg/dL Final    Albumin 3.1 (L) 3.4 - 5.0 g/dL Final    Protein Total 7.5 6.8 - 8.8 g/dL Final    Alkaline Phosphatase 98 40 - 150 U/L Final    ALT 76 (H) 0 - 50 U/L Final    AST 55 (H) 0 - 45 U/L Final         2/19/2017  4:37 PM - Interface, Flexilab Results      Component Results     Component Value Ref Range & Units Status    Lipase 107 73 - 393 U/L Final         2/19/2017  4:38 PM - Interface, Flexilab Results      Component Results     Component Value Ref Range & Units Status    Color Urine Red  Final    Appearance Urine Cloudy  Final    Glucose Urine Negative NEG mg/dL Final    Bilirubin Urine  NEG Final    Small  This is an unconfirmed screening test result. A positive result may be false.   (A)    Ketones Urine Trace (A) NEG mg/dL Final    Specific Gravity Urine 1.025 1.003 - 1.035 Final    Blood Urine Large (A) NEG Final    pH Urine 6.0 5.0 - 7.0 pH Final    Protein Albumin Urine >=300 (A) NEG mg/dL Final    Urobilinogen Urine 1.0 0.2 - 1.0 EU/dL Final    Nitrite Urine Negative NEG Final    Leukocyte Esterase Urine Small (A) NEG Final    Source Midstream Urine  Final         2/19/2017  5:45 PM - Interface, Radiant Ib      Narrative     CT ABDOMEN AND PELVIS WITHOUT CONTRAST 2/19/2017 5:04 PM      HISTORY: History of obstructing left kidney stone, status post stent  placement, worsening pain.    COMPARISON: 1/30/2017.    TECHNIQUE: Axial CT images of the abdomen and pelvis from the  diaphragm to the symphysis pubis were acquired without IV contrast.  Radiation dose for this scan was reduced using automated exposure  control, adjustment of the mA and/or kV according to patient size, or  iterative reconstruction technique.    FINDINGS: Nonobstructing stone on the right measuring 0.3 cm. No other  definite stone seen. A ureteral stent is noted on the left, there is  mild hydronephrosis. The previously seen large upper tract stones are  not seen today. There is mild left perinephric fat stranding. Kidneys  are normal in size and configuration.  Large amount of stool  throughout the colon. No definite bowel obstruction. Possible  hyperdense fluid is seen along the right abdomen extending from  Morison's pouch to the upper pelvis. Less likely this could represent  unopacified bowel. Liver and gallbladder unremarkable.  No  splenomegaly.  No definite adrenal nodules.  Pancreas grossly  unremarkable. The remainder of the visualized abdomen is unremarkable  on this noncontrast scan. Survey of the visualized bony structures  demonstrates no destructive bony lesions.   The visualized lung bases  are unremarkable.         Impression     IMPRESSION:   1. Ureteral stent in place on the left with mild hydronephrosis. The  hydronephrosis is improved compared to previous and the previously  seen stones are not seen today.  2. Possible complex fluid in the right abdomen which could indicate  some intra-abdominal bleeding. Ultrasound could be performed to  evaluate for complex fluid in the right flank. Less likely this could  represent unopacified bowel.     BERNIE MOCTEZUMA MD         2/19/2017  4:38 PM - Interface, Flexilab Results      Component Results     Component Value Ref Range & Units Status    WBC Urine  (A) 0  - 2 /HPF Final    RBC Urine >100 (A) 0 - 2 /HPF Final    Squamous Epithelial /LPF Urine Moderate (A) FEW /LPF Final    Bacteria Urine Moderate (A) NEG /HPF Final    Calcium Oxalate Moderate (A) NEG /HPF Final               2/19/2017  5:53 PM - Interface, Radiant Ib      Narrative     US ABDOMEN LIMITED 2/19/2017 5:41 PM     HISTORY: Possible fluid on CT.    COMPARISON: CT scan earlier same day.         Impression     IMPRESSION: No fluid is seen in the right abdomen, loops of bowel are  noted. Therefore the finding on CT is therefore unopacified bowel.       BERNIE MOCTEZUMA MD         2/19/2017  6:59 PM - Interface, Flexilab Results                Clinical Quality Measure: Blood Pressure Screening     Your blood pressure was checked while you were in the emergency department today. The last reading we obtained was  BP: 115/77 . Please read the guidelines below about what these numbers mean and what you should do about them.  If your systolic blood pressure (the top number) is less than 120 and your diastolic blood pressure (the bottom number) is less than 80, then your blood pressure is normal. There is nothing more that you need to do about it.  If your systolic blood pressure (the top number) is 120-139 or your diastolic blood pressure (the bottom number) is 80-89, your blood pressure may be higher than it should be. You should have your blood pressure rechecked within a year by a primary care provider.  If your systolic blood pressure (the top number) is 140 or greater or your diastolic blood pressure (the bottom number) is 90 or greater, you may have high blood pressure. High blood pressure is treatable, but if left untreated over time it can put you at risk for heart attack, stroke, or kidney failure. You should have your blood pressure rechecked by a primary care provider within the next 4 weeks.  If your provider in the emergency department today gave you specific instructions to follow-up with your doctor or  "provider even sooner than that, you should follow that instruction and not wait for up to 4 weeks for your follow-up visit.        Thank you for choosing [facility]       Thank you for choosing [facility] for your care. Our goal is always to provide you with excellent care. Hearing back from our patients is one way we can continue to improve our services. Please take a few minutes to complete the written survey that you may receive in the mail after you visit with us. Thank you!        [employer] Information     [employer] lets you send messages to your doctor, view your test results, renew your prescriptions, schedule appointments and more. To sign up, go to [email or web address] . Click on \"Log in\" on the left side of the screen, which will take you to the Welcome page. Then click on \"Sign up Now\" on the right side of the page.     You will be asked to enter the access code listed below, as well as some personal information. Please follow the directions to create your username and password.     Your access code is: [email or web address]  Expires: 2017  9:09 AM     Your access code will  in 90 days. If you need help or a new code, please call your [facility] clinic or [phone].        Care EveryWhere ID     This is your Care EveryWhere ID. This could be used by other organizations to access your [facility] medical records  [id number]        After Visit Summary       This is your record. Keep this with you and show to your community pharmacist(s) and doctor(s) at your next visit.                  "

## 2017-02-20 LAB
BACTERIA SPEC CULT: NORMAL
Lab: NORMAL
MICRO REPORT STATUS: NORMAL
SPECIMEN SOURCE: NORMAL

## 2017-02-20 NOTE — DISCHARGE INSTRUCTIONS
Rest, fluids, ibuprofen for pain, oxycodone for severe pain.   Start antibiotic. Will culture urine.   Cancel CT scan on Tuesday.   Follow up on Thursday as scheduled.   Return for worsening symptoms, high fevers, abdominal pain or any other new concerns.     *repeat liver function tests with primary care provider    Discharge Instructions  Urinary Tract Infection  You have urinary tract infection, or UTI. The urinary tract includes the kidneys (which make urine), ureters (the tubes that carry urine from the kidneys to the bladder), the bladder (which stores urine), and urethra (the tube that carries urine out of the bladder).  Urinary tract infections occur when bacteria travel up the urethra into the bladder. We suspect a UTI based on chemical and microscopic findings in your urine, but if there is a question about your findings, we will do a culture to see if bacteria grow. A urine culture takes several days. You should always follow-up with your primary physician to find out about results of your culture if one was done.   Return to the Emergency Department if:    You have severe back pain.    You are vomiting so that you can t take your medicine, or have signs of dehydration (such as urinating less than 3 times per day).    You have fever over 101.5 degrees F.    You have significant confusion or are very weak, or feel very ill.    Your child seems much more ill, won t wake up, won t respond right, or is crying for a long time and won t calm down.    Your child is showing signs of dehydration, Signs of dehydration can be:  o Your infant has had no wet diapers in 4-5 hours.  o Your older child has not passed urine in 6-8 hours.  o Your infant or child starts to have dry mouth and lips, or no saliva or tears.    Follow-up with your doctor:     Children under 24 months need to be seen by their regular doctor within one week after a diagnosis of a UTI. It may be necessary to do some imaging tests to look at the  child s kidney or bladder.    You should begin to feel better within 24 - 48 hours of starting your antibiotic.  If you do not, you need to be seen again.      Treatment:     You will be treated with an antibiotic to kill the bacteria. We have to make an educated guess as to which antibiotic will work for your infection. In most healthy people, we can guess right almost all of the time. Sometimes a culture is done to show which antibiotics will work. This usually takes 2-3 days. When the culture is done, we may have to contact you to put you on a different antibiotic.    Take a pain medication such as Tylenol  (acetaminophen), Advil  (ibuprofen), Nuprin  (ibuprofen), or Aleve  (naproxen). If you have been given a narcotic such as Vicodin  (hydrocodone with acetaminophen), Percocet  (oxycodone with acetaminophen), or codeine, do not drive for four hours after you have taken it. If the narcotic contains Tylenol  (acetaminophen), do not take Tylenol  with it. All narcotics will cause constipation, so eat a high fiber diet.      Pyridium  (phenazopyridine) or Uristat  (phenazopyridine) is a prescription medication that numbs the bladder to reduce the burning pain of some UTIs.  The same medication is available in a non-prescription version called Azo-Standard  (phenazopyridine), Urodol  (phenazopyridine), or other brand names. This medication will change the color of the urine and tears (usually blue or orange). If you wear contacts, do not wear them while taking this medication as they may be stained by the medication.    Antibiotic Warning:     If you have been placed on antibiotics - watch for signs of allergic reaction.  These include rash, lip swelling, difficulty breathing, wheezing, and dizziness.  If you develop any of these symptoms, stop the antibiotic immediately and go to an emergency room or urgent care for evaluation.    Probiotics: If you have been given an antibiotic, you may want to also take a  "probiotic pill or eat yogurt with live cultures. Probiotics have \"good bacteria\" to help your intestines stay healthy. Studies have shown that probiotics help prevent diarrhea and other intestine problems (including C. diff infection) when you take antibiotics. You can buy these without a prescription in the pharmacy section of the store.   If you were given a prescription for medicine here today, be sure to read all of the information (including the package insert) that comes with your prescription.  This will include important information about the medicine, its side effects, and any warnings that you need to know about.  The pharmacist who fills the prescription can provide more information and answer questions you may have about the medicine.  If you have questions or concerns that the pharmacist cannot address, please call or return to the Emergency Department.   Opioid Medication Information    Pain medications are among the most commonly prescribed medicines, so we are including this information for all our patients. If you did not receive pain medication or get a prescription for pain medicine, you can ignore it.     You may have been given a prescription for an opioid (narcotic) pain medicine and/or have received a pain medicine while here in the Emergency Department. These medicines can make you drowsy or impaired. You must not drive, operate dangerous equipment, or engage in any other dangerous activities while taking these medications. If you drive while taking these medications, you could be arrested for DUI, or driving under the influence. Do not drink any alcohol while you are taking these medications.     Opioid pain medications can cause addiction. If you have a history of chemical dependency of any type, you are at a higher risk of becoming addicted to pain medications.  Only take these prescribed medications to treat your pain when all other options have been tried. Take it for as short a time and " as few doses as possible. Store your pain pills in a secure place, as they are frequently stolen and provide a dangerous opportunity for children or visitors in your house to start abusing these powerful medications. We will not replace any lost or stolen medicine.  As soon as your pain is better, you should flush all your remaining medication.     Many prescription pain medications contain Tylenol  (acetaminophen), including Vicodin , Tylenol #3 , Norco , Lortab , and Percocet .  You should not take any extra pills of Tylenol  if you are using these prescription medications or you can get very sick.  Do not ever take more than 3000 mg of acetaminophen in any 24 hour period.    All opioids tend to cause constipation. Drink plenty of water and eat foods that have a lot of fiber, such as fruits, vegetables, prune juice, apple juice and high fiber cereal.  Take a laxative if you don t move your bowels at least every other day. Miralax , Milk of Magnesia, Colace , or Senna  can be used to keep you regular.      Remember that you can always come back to the Emergency Department if you are not able to see your regular doctor in the amount of time listed above, if you get any new symptoms, or if there is anything that worries you.

## 2019-03-05 ENCOUNTER — HOSPITAL ENCOUNTER (EMERGENCY)
Facility: CLINIC | Age: 54
Discharge: HOME | End: 2019-03-06
Attending: EMERGENCY MEDICINE | Admitting: EMERGENCY MEDICINE
Payer: MEDICAID

## 2019-03-05 DIAGNOSIS — M19.90 INFLAMMATORY ARTHRITIS: ICD-10-CM

## 2019-03-05 PROCEDURE — 99285 EMERGENCY DEPT VISIT HI MDM: CPT | Mod: 25

## 2019-03-05 RX ORDER — LIDOCAINE 40 MG/G
CREAM TOPICAL
Status: COMPLETED
Start: 2019-03-05 | End: 2019-03-06

## 2019-03-05 ASSESSMENT — MIFFLIN-ST. JEOR: SCORE: 1291.74

## 2019-03-05 NOTE — ED AVS SNAPSHOT
Emergency Department  [address] [place] [address]  Phone:  [phone]  Fax:  [phone]                                    Jess Hudson   MRN: [id number]    Department:   Emergency Department   Date of Visit:  3/5/2019           After Visit Summary Signature Page    I have received my discharge instructions, and my questions have been answered. I have discussed any challenges I see with this plan with the nurse or doctor.    ..........................................................................................................................................  Patient/Patient Representative Signature      ..........................................................................................................................................  Patient Representative Print Name and Relationship to Patient    ..................................................               ................................................  Date                                   Time    ..........................................................................................................................................  Reviewed by Signature/Title    ...................................................              ..............................................  Date                                               Time          22EPIC Rev 08/18

## 2019-03-06 ENCOUNTER — APPOINTMENT (OUTPATIENT)
Dept: GENERAL RADIOLOGY | Facility: CLINIC | Age: 54
End: 2019-03-06
Attending: EMERGENCY MEDICINE
Payer: MEDICAID

## 2019-03-06 ENCOUNTER — APPOINTMENT (OUTPATIENT)
Dept: ULTRASOUND IMAGING | Facility: CLINIC | Age: 54
End: 2019-03-06
Attending: EMERGENCY MEDICINE
Payer: MEDICAID

## 2019-03-06 VITALS
DIASTOLIC BLOOD PRESSURE: 63 MMHG | SYSTOLIC BLOOD PRESSURE: 114 MMHG | BODY MASS INDEX: 24.83 KG/M2 | WEIGHT: 149 LBS | TEMPERATURE: 98 F | OXYGEN SATURATION: 97 % | HEART RATE: 95 BPM | HEIGHT: 65 IN | RESPIRATION RATE: 18 BRPM

## 2019-03-06 LAB
ANION GAP SERPL CALCULATED.3IONS-SCNC: 8 MMOL/L (ref 3–14)
BASOPHILS # BLD AUTO: 0 10E9/L (ref 0–0.2)
BASOPHILS NFR BLD AUTO: 0.3 %
BUN SERPL-MCNC: 24 MG/DL (ref 7–30)
CALCIUM SERPL-MCNC: 8.3 MG/DL (ref 8.5–10.1)
CHLORIDE SERPL-SCNC: 105 MMOL/L (ref 94–109)
CO2 SERPL-SCNC: 24 MMOL/L (ref 20–32)
CREAT SERPL-MCNC: 0.62 MG/DL (ref 0.52–1.04)
CRP SERPL-MCNC: 121 MG/L (ref 0–8)
DIFFERENTIAL METHOD BLD: ABNORMAL
EOSINOPHIL # BLD AUTO: 0.2 10E9/L (ref 0–0.7)
EOSINOPHIL NFR BLD AUTO: 2.9 %
ERYTHROCYTE [DISTWIDTH] IN BLOOD BY AUTOMATED COUNT: 13.2 % (ref 10–15)
ERYTHROCYTE [SEDIMENTATION RATE] IN BLOOD BY WESTERGREN METHOD: 41 MM/H (ref 0–30)
GFR SERPL CREATININE-BSD FRML MDRD: >90 ML/MIN/{1.73_M2}
GLUCOSE SERPL-MCNC: 105 MG/DL (ref 70–99)
HCT VFR BLD AUTO: 36.3 % (ref 35–47)
HGB BLD-MCNC: 12.4 G/DL (ref 11.7–15.7)
IMM GRANULOCYTES # BLD: 0 10E9/L (ref 0–0.4)
IMM GRANULOCYTES NFR BLD: 0.3 %
LYMPHOCYTES # BLD AUTO: 1.6 10E9/L (ref 0.8–5.3)
LYMPHOCYTES NFR BLD AUTO: 24.7 %
MCH RBC QN AUTO: 31.5 PG (ref 26.5–33)
MCHC RBC AUTO-ENTMCNC: 34.2 G/DL (ref 31.5–36.5)
MCV RBC AUTO: 92 FL (ref 78–100)
MONOCYTES # BLD AUTO: 1 10E9/L (ref 0–1.3)
MONOCYTES NFR BLD AUTO: 14.6 %
NEUTROPHILS # BLD AUTO: 3.8 10E9/L (ref 1.6–8.3)
NEUTROPHILS NFR BLD AUTO: 57.2 %
NRBC # BLD AUTO: 0 10*3/UL
NRBC BLD AUTO-RTO: 0 /100
PLATELET # BLD AUTO: 138 10E9/L (ref 150–450)
POTASSIUM SERPL-SCNC: 4.1 MMOL/L (ref 3.4–5.3)
RBC # BLD AUTO: 3.94 10E12/L (ref 3.8–5.2)
SODIUM SERPL-SCNC: 137 MMOL/L (ref 133–144)
URATE SERPL-MCNC: 2.7 MG/DL (ref 2.6–6)
WBC # BLD AUTO: 6.6 10E9/L (ref 4–11)

## 2019-03-06 PROCEDURE — 86140 C-REACTIVE PROTEIN: CPT | Performed by: EMERGENCY MEDICINE

## 2019-03-06 PROCEDURE — 73562 X-RAY EXAM OF KNEE 3: CPT | Mod: LT

## 2019-03-06 PROCEDURE — 80048 BASIC METABOLIC PNL TOTAL CA: CPT | Performed by: EMERGENCY MEDICINE

## 2019-03-06 PROCEDURE — 85025 COMPLETE CBC W/AUTO DIFF WBC: CPT | Performed by: EMERGENCY MEDICINE

## 2019-03-06 PROCEDURE — 25000132 ZZH RX MED GY IP 250 OP 250 PS 637: Performed by: EMERGENCY MEDICINE

## 2019-03-06 PROCEDURE — 25000125 ZZHC RX 250

## 2019-03-06 PROCEDURE — 93971 EXTREMITY STUDY: CPT | Mod: LT

## 2019-03-06 PROCEDURE — 85652 RBC SED RATE AUTOMATED: CPT | Performed by: EMERGENCY MEDICINE

## 2019-03-06 PROCEDURE — 84550 ASSAY OF BLOOD/URIC ACID: CPT | Performed by: EMERGENCY MEDICINE

## 2019-03-06 RX ORDER — LORAZEPAM 0.5 MG/1
1 TABLET ORAL ONCE
Status: COMPLETED | OUTPATIENT
Start: 2019-03-06 | End: 2019-03-06

## 2019-03-06 RX ADMIN — LORAZEPAM 1 MG: 0.5 TABLET ORAL at 00:16

## 2019-03-06 RX ADMIN — LIDOCAINE: 40 CREAM TOPICAL at 00:16

## 2019-03-06 ASSESSMENT — ENCOUNTER SYMPTOMS
JOINT SWELLING: 1
NAUSEA: 0
VOMITING: 0
COLOR CHANGE: 1
FEVER: 0

## 2019-03-06 NOTE — ED NOTES
Attempted IV x1 after pt has LMX on for 30 min. Pt very anxious and tearful and refuses to have IV done. Report to MD

## 2019-03-06 NOTE — ED PROVIDER NOTES
"  History     Chief Complaint:  Joint Swelling       HPI   Jess Hudson is a 53 year old female who was seen at [facility] on 2/5 and 3/5 and had her left knee tapped with unremarkable results who presents with her boyfriend for evaluation of left knee swelling. The patient reports that over the past 5 days she has noticed increased swelling which began in her left toes. She reports that at the time her knee began feeling \"weird\" and she was unable to kneel on her left knee. This morning she states that she could not bear weight on her left leg. Over the course of the day the swelling seemed to get better but has gotten worse this evening. Patient states that the  swelling in her left knee is spreading up her thigh and down her shin and the swelling in her knee is getting worse. Of note, the patient's right hand has also been swollen but the swelling has improved since she has been soaking her hand, applying cold compresses, and elevating her right hand. She states that she occasionally experiences shooting pain in her hand. The patient denies IV current IV drug use but has a history of use. She denies fever, nausea, vomiting, chest pain, rash or history of blood clots.       [facility]: 3/03/19  Genital Chlamydia and Neisseriae Gonorrhoeae Amplification: negative  Crystal ID (synovial fluid): Negative (white blood cells present, red blood cells present)  Body Fluid cell count/diff: WNL  HIV antigen antibody: nonreactive   Qualitative serum pregnancy: Negative  ED chemistry lab: Chloride 109, Anion Gap 7, ICA pH corrected 4.35  CBC with platelets: WNL  SED rate: 16  Lactate: 1.3  C reactive protein: 37      Allergies:  No known drug allergies     Medications:    Zofran  Oxybutynin   Oxycodone      Past Medical History:    Kidney stone    Past Surgical History:    APPENDECTOMY   BREAST SURGERY   CYSTOSCOPY, REMOVE STENT(S), COMBINED   CYSTOSCOPY, RETROGRADES, INSERT STENT URETER(S), " "COMBINED   EXTRACORPOREAL SHOCK WAVE LITHOTRIPSY (ESWL)   LASER HOLMIUM LITHOTRIPSY URETER(S), INSERT STENT, COMBINED     Family History:    History reviewed. No pertinent family history.     Social History:  Smoking status: Former smoker  Alcohol use: No  Marital Status:  Single [1]     Review of Systems   Constitutional: Negative for fever.   Cardiovascular: Negative for chest pain.   Gastrointestinal: Negative for nausea and vomiting.   Musculoskeletal: Positive for gait problem and joint swelling.   Skin: Positive for color change. Negative for rash.   All other systems reviewed and are negative.      Physical Exam     Patient Vitals for the past 24 hrs:   BP Temp Temp src Pulse Heart Rate Resp SpO2 Height Weight   03/06/19 0337 114/63 -- -- 95 -- -- 97 % -- --   03/05/19 2301 145/61 98  F (36.7  C) Oral -- 118 18 100 % 1.651 m (5' 5\") 67.6 kg (149 lb)         Physical Exam  General: Resting on the bed.  Head: No obvious trauma to head.  Ears, Nose, Throat:  External ears normal.  Nose normal.   Eyes:  Conjunctivae clear.   Neck: Normal range of motion.  Neck supple.   CV: Regular rate and rhythm.  No murmurs.      Respiratory: Effort normal and breath sounds normal.  No wheezing or crackles.   Gastrointestinal: Soft.  No distension. There is no tenderness.   Musculoskeletal: Right wrist swelling, pitting edema in the hand.  Sensation intact.  Cap refill intact.  2+ radial pulses.  Left knee with swelling, range of motion is largely intact with some decreased flexion due to swelling and pain.  Full extension of threshold.  No laxity to valgus or varus strain of the knee.  Mild edema to the left lower extremity.  Nontender posterior calf.  Left ankle and toes unremarkable with full range of motion and nontender to examination.  2+ DP pulses.  Sensation intact light touch.  Neuro: Alert. Moving all extremities appropriately.  Normal speech.    Skin: Skin is warm and dry.  No rash noted. No overlying " redness.    Emergency Department Course     Imaging:  Radiographic findings were communicated with the patient who voiced understanding of the findings.    XR knee left   IMPRESSION:  1. No visualized acute fracture, malalignment or other acute osseous  abnormality of the left knee.  2. Probable small left knee joint effusion  As read by radiology     US lower extremity venous duplex left  IMPRESSION:  1. No evidence of thrombus in the major veins of the left lower  extremity.  2. Partial visualization of complex fluid anterior to the left knee.  This is nonspecific, but could represent joint effusion.  As read by radiology     Laboratory:  Uric acid: 2.7  CRP inflammation: 121.0  Erythrocyte sedimentation rate auto: 41  CBC: WBC 6.6, HGB 12.4,   BMP: Glucose 105, Calcium 8.3, WNL (Creatinine 0.62)    Interventions:  0016: Ativan 1 mg IV    Emergency Department Course:  Past medical records, nursing notes, and vitals reviewed.  2335: I performed an exam of the patient and obtained history, as documented above.    IV inserted and blood drawn.    The patient was sent for a XR knee left and US lower extremity venous duplex left while in the emergency department, findings above.    0321: I rechecked the patient. Findings and plan explained to the Patient. Patient discharged home with instructions regarding supportive care, medications, and reasons to return. The importance of close follow-up was reviewed.     Impression & Plan      Medical Decision Makin-year-old female with a very distant history of IV drug use presents with poly articular inflammatory arthritis.  Vital signs initially mildly tachycardic the patient is quite anxious.  Is resolved on repeat.  Afebrile.  Broad differential was pursued including but not limited to septic joint, gout, pseudogout, inflammatory process, infectious process, fracture dislocation, DVT, neurovascular injury, etc.  CBC shows no leukocytosis or anemia.  Mild  thrombocytopenia.  BMP shows no acute lactic metabolic or renal dysfunction.  ESR is elevated 41, CRP at 121.  Comparing to prior sed rate was within normal limits but CRP was elevated at 37.  This pattern seems the most consistent with a inflammatory process.  Uric acid is normal and previous joint tap showed no evidence of crystals.  More importantly previous joint tap showed only 37,000 white count and fluid culture was negative.  At this point there is no evidence of septic joint.  Patient reports that the pain is the same as it was when she had the arthrocentesis.  Did offer repeat for both fluid removal and comfort but she declined.  Patient is quite anxious about needles and injections overall.  No signs of infection near prior arthrocentesis site.  X-ray shows a small joint effusion but no acute fracture or malalignment or other osseous abnormality.  Ultrasound of the left lower extremity shows no evidence of DVT.  Overall suspect ongoing inflammatory arthritis.  Without fevers or chills or concerning signs or symptoms do not suspect septic joint.  Additionally negative cell culture from knee joint is also very reassuring.  Advised supportive care as, Ace wrap for comfort and compression, ice.  Patient is advised to follow-up with a primary doctor in 2 days for reassessment.  Continue anti-inflammatory usage.  She was also given the number for rheumatology for follow-up as well.  Patient voiced understand the plan and felt comfortable going home.      Diagnosis:    ICD-10-CM    1. Inflammatory arthritis M19.90        Disposition:  discharged to home    Roberto Carlos Pulido  3/5/2019    EMERGENCY DEPARTMENT    Scribe Disclosure:  I, Roberto Carlos Pulido, am serving as a scribe at 11:35 PM on 3/5/2019 to document services personally performed by Amelia Rowan MD based on my observations and the provider's statements to me.        Amelia Rowan MD  03/06/19 1362

## 2019-03-06 NOTE — DISCHARGE INSTRUCTIONS
Continue ibuprofen for inflammation.  If having fevers, worsening pain, worsening redness or swelling, chest pain, shortness of breath, etc. please return to the emergency department sooner.  Please follow-up with your primary doctor and rheumatology to further discuss this likely inflammatory process.  May use Ace wrap for compression.  Ice and elevate the limbs as necessary.

## 2019-03-07 ENCOUNTER — HOSPITAL ENCOUNTER (EMERGENCY)
Facility: CLINIC | Age: 54
Discharge: HOME | End: 2019-03-07
Attending: EMERGENCY MEDICINE | Admitting: EMERGENCY MEDICINE
Payer: MEDICAID

## 2019-03-07 VITALS
OXYGEN SATURATION: 99 % | SYSTOLIC BLOOD PRESSURE: 103 MMHG | RESPIRATION RATE: 18 BRPM | DIASTOLIC BLOOD PRESSURE: 77 MMHG | TEMPERATURE: 98.9 F | HEART RATE: 96 BPM | WEIGHT: 140 LBS | BODY MASS INDEX: 23.3 KG/M2

## 2019-03-07 DIAGNOSIS — M25.50 POLYARTHRALGIA: ICD-10-CM

## 2019-03-07 DIAGNOSIS — M19.90 INFLAMMATORY ARTHRITIS: ICD-10-CM

## 2019-03-07 PROCEDURE — 25000125 ZZHC RX 250: Performed by: EMERGENCY MEDICINE

## 2019-03-07 PROCEDURE — 99283 EMERGENCY DEPT VISIT LOW MDM: CPT

## 2019-03-07 RX ORDER — PREDNISONE 10 MG/1
TABLET ORAL
Qty: 19 TABLET | Refills: 0 | Status: SHIPPED | OUTPATIENT
Start: 2019-03-07

## 2019-03-07 RX ORDER — PREDNISONE 20 MG/1
60 TABLET ORAL ONCE
Status: COMPLETED | OUTPATIENT
Start: 2019-03-07 | End: 2019-03-07

## 2019-03-07 RX ORDER — TRAMADOL HYDROCHLORIDE 50 MG/1
50-100 TABLET ORAL EVERY 6 HOURS PRN
Qty: 16 TABLET | Refills: 0 | Status: SHIPPED | OUTPATIENT
Start: 2019-03-07 | End: 2019-03-11

## 2019-03-07 RX ORDER — PREDNISONE 10 MG/1
TABLET ORAL
Qty: 19 TABLET | Refills: 0 | Status: SHIPPED | OUTPATIENT
Start: 2019-03-07 | End: 2019-03-07

## 2019-03-07 RX ADMIN — PREDNISONE 60 MG: 20 TABLET ORAL at 18:59

## 2019-03-07 ASSESSMENT — ENCOUNTER SYMPTOMS
FEVER: 0
JOINT SWELLING: 1

## 2019-03-07 NOTE — ED AVS SNAPSHOT
Emergency Department  [address] [place] [address]  Phone:  [phone]  Fax:  [phone]                                    Jess Hudson   MRN: [id number]    Department:   Emergency Department   Date of Visit:  3/7/2019           After Visit Summary Signature Page    I have received my discharge instructions, and my questions have been answered. I have discussed any challenges I see with this plan with the nurse or doctor.    ..........................................................................................................................................  Patient/Patient Representative Signature      ..........................................................................................................................................  Patient Representative Print Name and Relationship to Patient    ..................................................               ................................................  Date                                   Time    ..........................................................................................................................................  Reviewed by Signature/Title    ...................................................              ..............................................  Date                                               Time          22EPIC Rev 08/18

## 2019-03-07 NOTE — ED TRIAGE NOTES
Pt reports LLE swelling x 10 days which has progressively increased radiating to calf and knee +2-3 pitting. Pedal pulses present CMS intact.  R hand and wrist swelling x 9 days +3 pitting, CMS intact, tips of fingers cold. Denies trauma to either extremity or recent fall. Pt has had several UC and ED visits in the past week with no resolution of symptoms. Pt is tearful, pain rated 7 out of 10 on arrival.

## 2019-03-08 NOTE — DISCHARGE INSTRUCTIONS
-Take acetaminophen 500 to 1000 mg by mouth every 4 to 6 hours as needed for pain or fever.  Do not take more than 4000 mg in 24 hours.  Do not take within 6 hours of another acetaminophen containing medication such as norco (vicodin) or percocet.  - Take ibuprofen 600 to 800 mg by mouth every 6 to 8 hours as needed for pain or fever          Please follow-up with reumatology as previously instructed.  Please call tomorrow to make an appointment.    Please return to the emergency department as needed for new or worsening symptoms including severe and uncontrollable pain, fever greater than 100.4  F, vomiting and unable to keep anything down, any other concerning symptoms.

## 2019-03-08 NOTE — ED PROVIDER NOTES
History     Chief Complaint:  Leg Swelling     HPI   Jess Hudson is a 53 year old female who presents to the emergency department for evaluation of left leg swelling and pain. The patient reports she went to a  on  and later that night noted increasing pain to her left foot and knee. This pain worsened overnight, and she later developed right wrist swelling and pain as well. She has been seen a number of times at the ED, both at [facility] and [facility]. She indicates after her most recent visit on 3/5, she did have some improvement, but through the day today, she has had worsening of her pain and swelling, currently rated 5/10. The patient remarks she has difficulty ambulating secondary to the pain. She denies any fever or recent travel.    Allergies:  No known drug allergies      Medications:    Zofran  Oxybutynin   Oxycodone      Past Medical History:    Kidney stone     Past Surgical History:    Appendectomy  Breast surgery  ESWL  Insert stent ureters    Family History:    History reviewed. No pertinent family history.      Social History:  Presents with significant other.  Smoking status: Former smoker  Alcohol use: No  Marital Status:  Single [1]      Review of Systems   Constitutional: Negative for fever.   Cardiovascular: Positive for leg swelling.   Musculoskeletal: Positive for gait problem and joint swelling.   All other systems reviewed and are negative.        Physical Exam     Patient Vitals for the past 24 hrs:   BP Temp Temp src Pulse Resp SpO2 Weight   19 1800 103/77 98.9  F (37.2  C) Oral 96 18 99 % 63.5 kg (140 lb)     Physical Exam  Constitutional: Well developed, nontox appearance  Head: Atraumatic.   Mouth/Throat: Oropharynx is clear and moist.   Neck:  no stridor  Eyes: no scleral icterus  Cardiovascular: RRR, 2+ bilat radial, left DP pulses  Pulmonary/Chest: nml resp effort, Clear BS bilat  Ext: Warm, well perfused   RUE: ROM limited by pain, swelling, warmth and tenderness to  right wrist and hand, pain with passive extension, brisk cap refill, sensation intact   LLE: Full active ROM, swelling, warmth and tenderness to knee, ankle and foot, brisk cap refill, sensation intact  Neurological: A&O, symmetric facies, moves ext x4  Skin: Skin is warm and dry.   Psychiatric: Behavior is normal. Thought content normal.   Nursing note and vitals reviewed.        Emergency Department Course   Interventions:   Deltasone 60 mg PO    Emergency Department Course:  Nursing notes and vitals reviewed.  I performed an exam of the patient as documented above.     Medicine administered as documented above.      I rechecked the patient and discussed the results of her workup thus far.     Findings and plan explained to the Patient. Patient discharged home with instructions regarding supportive care, medications, and reasons to return. The importance of close follow-up was reviewed. The patient was prescribed Deltasone, Ultram.    I personally reviewed the laboratory results with the Patient and answered all related questions prior to discharge.    Impression & Plan      Medical Decision Makin-year-old female presenting with left lower extremity and right upper extremity edema, pain, tenderness and warmth    Patient has had multiple ED visits in the last approximately 1 week.  She was seen [facility] [place] which point she underwent arthrocentesis with subsequent crystal analysis, culture and testing for gonorrhea and chlamydia.  Crystal analysis was negative and culture returned with no growth.  Gonorrhea and Chlamydia testing are negative.  Synovial fluid WBC 37,000.  Previous labs demonstrated elevated CRP with normal white blood cell count.  It is likely the patient is experiencing an inflammatory noninfectious poly-arthritis.  It seems less likely to be infectious given her normal white count, lack of fever, negative synovial fluid culture.  No other signs of endocarditis, sepsis or bacteremia.   Patient was instructed to follow-up with rheumatology although she has not made an appointment yet.  Given her waxing and waning significant discomfort, her multiple ED visits, I think it would be reasonable to start the patient on a short course of prednisone for further symptom control.  I discussed the pros and cons of starting prednisone prior to rheumatologic evaluation.  Given the uncertainty of the patient's follow-up with rheumatology clinic and her level of discomfort, I think would be reasonable to start her on prednisone.  Patient counseled on diagnosis and disposition.  Recommendations given regarding follow-up with rheumatology clinic, her primary care doctor and return to the emergency department as needed for new or worsening symptoms.  Patient was subsequently discharged in stable condition.  She is understanding and agreeable to plan.    Diagnosis:    ICD-10-CM   1. Polyarthralgia M25.50   2. Inflammatory arthritis M19.90       Disposition:  discharged to home    Discharge Medications:     Medication List      Started    predniSONE 10 MG tablet  Commonly known as:  DELTASONE  Day 1-3: Take 30 mg (three tabs) by mouth daily  Day 4-6: Take 20 mg (two tabs) by mouth daily  Day 7-10: Take 10 mg (one tabs) by mouth daily     traMADol 50 MG tablet  Commonly known as:  ULTRAM   mg, Oral, EVERY 6 HOURS PRN          IRj, am serving as a scribe on 3/7/2019 at 6:21 PM to personally document services performed by Alexandr Medina MD based on my observations and the provider's statements to me.     Rj Dawson  3/7/2019    EMERGENCY DEPARTMENT       Alexandr Medina MD  03/07/19 7263

## 2019-03-08 NOTE — ED PROVIDER NOTES
Patient had lost Rx for prednisone, so it was reprinted and signed.     Dago Dumont MD  03/07/19 0324

## 2019-03-11 ENCOUNTER — HOSPITAL ENCOUNTER (EMERGENCY)
Facility: CLINIC | Age: 54
Discharge: HOME | End: 2019-03-11
Attending: EMERGENCY MEDICINE | Admitting: EMERGENCY MEDICINE
Payer: MEDICAID

## 2019-03-11 ENCOUNTER — APPOINTMENT (OUTPATIENT)
Dept: ULTRASOUND IMAGING | Facility: CLINIC | Age: 54
End: 2019-03-11
Attending: EMERGENCY MEDICINE
Payer: MEDICAID

## 2019-03-11 ENCOUNTER — HOSPITAL ENCOUNTER (EMERGENCY)
Facility: CLINIC | Age: 54
Discharge: HOME | End: 2019-03-12
Attending: EMERGENCY MEDICINE | Admitting: EMERGENCY MEDICINE
Payer: MEDICAID

## 2019-03-11 ENCOUNTER — APPOINTMENT (OUTPATIENT)
Dept: GENERAL RADIOLOGY | Facility: CLINIC | Age: 54
End: 2019-03-11
Attending: EMERGENCY MEDICINE
Payer: MEDICAID

## 2019-03-11 VITALS
SYSTOLIC BLOOD PRESSURE: 119 MMHG | HEART RATE: 77 BPM | OXYGEN SATURATION: 97 % | TEMPERATURE: 97.3 F | RESPIRATION RATE: 20 BRPM | DIASTOLIC BLOOD PRESSURE: 75 MMHG

## 2019-03-11 DIAGNOSIS — M06.4 INFLAMMATORY POLYARTHROPATHY (H): ICD-10-CM

## 2019-03-11 DIAGNOSIS — M13.0 POLYARTHRITIS: ICD-10-CM

## 2019-03-11 DIAGNOSIS — N30.01 ACUTE CYSTITIS WITH HEMATURIA: ICD-10-CM

## 2019-03-11 LAB
ALBUMIN UR-MCNC: 10 MG/DL
ANION GAP SERPL CALCULATED.3IONS-SCNC: 11 MMOL/L (ref 3–14)
APPEARANCE UR: ABNORMAL
BACTERIA #/AREA URNS HPF: ABNORMAL /HPF
BILIRUB UR QL STRIP: NEGATIVE
BUN SERPL-MCNC: 23 MG/DL (ref 7–30)
CALCIUM SERPL-MCNC: 8.2 MG/DL (ref 8.5–10.1)
CHLORIDE SERPL-SCNC: 103 MMOL/L (ref 94–109)
CO2 SERPL-SCNC: 23 MMOL/L (ref 20–32)
COLOR UR AUTO: YELLOW
CREAT SERPL-MCNC: 0.58 MG/DL (ref 0.52–1.04)
CRP SERPL-MCNC: 53 MG/L (ref 0–8)
ERYTHROCYTE [SEDIMENTATION RATE] IN BLOOD BY WESTERGREN METHOD: 47 MM/H (ref 0–30)
GFR SERPL CREATININE-BSD FRML MDRD: >90 ML/MIN/{1.73_M2}
GLUCOSE SERPL-MCNC: 100 MG/DL (ref 70–99)
GLUCOSE UR STRIP-MCNC: NEGATIVE MG/DL
HCG SERPL QL: NEGATIVE
HGB UR QL STRIP: ABNORMAL
KETONES UR STRIP-MCNC: NEGATIVE MG/DL
LEUKOCYTE ESTERASE UR QL STRIP: ABNORMAL
NITRATE UR QL: NEGATIVE
NT-PROBNP SERPL-MCNC: 1075 PG/ML (ref 0–900)
PH UR STRIP: 6.5 PH (ref 5–7)
POTASSIUM SERPL-SCNC: 4.1 MMOL/L (ref 3.4–5.3)
RBC #/AREA URNS AUTO: 132 /HPF (ref 0–2)
SODIUM SERPL-SCNC: 137 MMOL/L (ref 133–144)
SOURCE: ABNORMAL
SP GR UR STRIP: 1.02 (ref 1–1.03)
SQUAMOUS #/AREA URNS AUTO: 10 /HPF (ref 0–1)
TROPONIN I SERPL-MCNC: <0.015 UG/L (ref 0–0.04)
UROBILINOGEN UR STRIP-MCNC: 2 MG/DL (ref 0–2)
WBC #/AREA URNS AUTO: >182 /HPF (ref 0–5)
WBC CLUMPS #/AREA URNS HPF: PRESENT /HPF

## 2019-03-11 PROCEDURE — 87086 URINE CULTURE/COLONY COUNT: CPT | Performed by: EMERGENCY MEDICINE

## 2019-03-11 PROCEDURE — 80048 BASIC METABOLIC PNL TOTAL CA: CPT | Performed by: EMERGENCY MEDICINE

## 2019-03-11 PROCEDURE — 71046 X-RAY EXAM CHEST 2 VIEWS: CPT

## 2019-03-11 PROCEDURE — 99285 EMERGENCY DEPT VISIT HI MDM: CPT | Mod: 25 | Performed by: EMERGENCY MEDICINE

## 2019-03-11 PROCEDURE — 85652 RBC SED RATE AUTOMATED: CPT | Performed by: EMERGENCY MEDICINE

## 2019-03-11 PROCEDURE — 86140 C-REACTIVE PROTEIN: CPT | Performed by: EMERGENCY MEDICINE

## 2019-03-11 PROCEDURE — 87088 URINE BACTERIA CULTURE: CPT | Performed by: EMERGENCY MEDICINE

## 2019-03-11 PROCEDURE — 25000132 ZZH RX MED GY IP 250 OP 250 PS 637: Performed by: EMERGENCY MEDICINE

## 2019-03-11 PROCEDURE — 99284 EMERGENCY DEPT VISIT MOD MDM: CPT | Mod: Z6 | Performed by: EMERGENCY MEDICINE

## 2019-03-11 PROCEDURE — 93005 ELECTROCARDIOGRAM TRACING: CPT | Performed by: EMERGENCY MEDICINE

## 2019-03-11 PROCEDURE — 93971 EXTREMITY STUDY: CPT | Mod: LT

## 2019-03-11 PROCEDURE — 93010 ELECTROCARDIOGRAM REPORT: CPT | Mod: Z6 | Performed by: EMERGENCY MEDICINE

## 2019-03-11 PROCEDURE — 83880 ASSAY OF NATRIURETIC PEPTIDE: CPT | Performed by: EMERGENCY MEDICINE

## 2019-03-11 PROCEDURE — 99282 EMERGENCY DEPT VISIT SF MDM: CPT | Performed by: EMERGENCY MEDICINE

## 2019-03-11 PROCEDURE — 81001 URINALYSIS AUTO W/SCOPE: CPT | Performed by: EMERGENCY MEDICINE

## 2019-03-11 PROCEDURE — 84484 ASSAY OF TROPONIN QUANT: CPT | Performed by: EMERGENCY MEDICINE

## 2019-03-11 PROCEDURE — 84703 CHORIONIC GONADOTROPIN ASSAY: CPT | Performed by: EMERGENCY MEDICINE

## 2019-03-11 RX ORDER — TRAMADOL HYDROCHLORIDE 50 MG/1
100 TABLET ORAL ONCE
Status: COMPLETED | OUTPATIENT
Start: 2019-03-11 | End: 2019-03-11

## 2019-03-11 RX ORDER — CEFTRIAXONE 2 G/1
2 INJECTION, POWDER, FOR SOLUTION INTRAMUSCULAR; INTRAVENOUS ONCE
Status: DISCONTINUED | OUTPATIENT
Start: 2019-03-11 | End: 2019-03-12

## 2019-03-11 RX ADMIN — TRAMADOL HYDROCHLORIDE 100 MG: 50 TABLET, COATED ORAL at 18:32

## 2019-03-11 ASSESSMENT — ENCOUNTER SYMPTOMS
SHORTNESS OF BREATH: 0
NUMBNESS: 1
FEVER: 0
NECK STIFFNESS: 0
CONFUSION: 0
EYE REDNESS: 0
HEADACHES: 0
ABDOMINAL PAIN: 0
DIFFICULTY URINATING: 0
COLOR CHANGE: 0
ARTHRALGIAS: 1

## 2019-03-11 NOTE — ED NOTES
Patient awake and alert, tearful. Respirations even and unlabored. Skin warm and dry. Brought in by

## 2019-03-11 NOTE — ED AVS SNAPSHOT
[facility], [place], Emergency Department  [address] [place] [address]  Phone:  [phone]                                    Jess Hudson   MRN: [id number]    Department:  [facility], Emergency Department   Date of Visit:  3/11/2019           After Visit Summary Signature Page    I have received my discharge instructions, and my questions have been answered. I have discussed any challenges I see with this plan with the nurse or doctor.    ..........................................................................................................................................  Patient/Patient Representative Signature      ..........................................................................................................................................  Patient Representative Print Name and Relationship to Patient    ..................................................               ................................................  Date                                   Time    ..........................................................................................................................................  Reviewed by Signature/Title    ...................................................              ..............................................  Date                                               Time          22EPIC Rev 08/18

## 2019-03-11 NOTE — ED AVS SNAPSHOT
[facility], [place], Emergency Department  [address]  [facility] [address]  Phone:  [phone]  Fax:  [phone]                                    Jess Hudson   MRN: [id number]    Department:  [facility], Emergency Department   Date of Visit:  3/11/2019           After Visit Summary Signature Page    I have received my discharge instructions, and my questions have been answered. I have discussed any challenges I see with this plan with the nurse or doctor.    ..........................................................................................................................................  Patient/Patient Representative Signature      ..........................................................................................................................................  Patient Representative Print Name and Relationship to Patient    ..................................................               ................................................  Date                                   Time    ..........................................................................................................................................  Reviewed by Signature/Title    ...................................................              ..............................................  Date                                               Time          22EPIC Rev 08/18

## 2019-03-11 NOTE — ED PROVIDER NOTES
History     Chief Complaint   Patient presents with     Ankle Pain     Bilateral foot pain for 2 weeks, left ankle swelling, no trauma      Wrist Pain     Right wrist pain 2 weeks with swelling, no trauma      HPI  Jess Hudson is a 53 year old female who presents to the ED with pain in multiple joints. Patient reports pain started primarily in her left great toe. It has also then been present in her left knee and right wrist. She had x-rays of the joints and a left [place] [facility] [place] a [place] ago. She went to [facility] a few days ago with more left lower leg swelling and had negative left knee x-ray and negative left lower leg venous ultrasound. Patient initially was using oxycodone and indomethacin for pain, now prednisone and tramadol after the [facility] visit. Pain increased again this past day, which prompted her to present again. She has had no fevers, feels well other than the pain in the joints. She has crutches. She states she has appointments with [facility] on 3/14 and internal medicine 3/18.     I have reviewed the Medications, Allergies, Past Medical and Surgical History, and Social History in the Epic system.    Review of Systems  No recent fevers, no chest pain, no abdominal pain    Physical Exam   BP: 119/75  Pulse: 77  Resp: 20  SpO2: 97 %    Physical Exam  General: No acute distress. Appears stated age.   HENT: MMM, no oropharyngeal lesions  Eyes: PERRL, normal sclerae   Cardio: Regular rate, extremities well perfused  Resp: Normal work of breathing, normal respiratory rate  Neuro: alert and fully oriented. CN II-XII grossly intact. Grossly normal strength and sensation in all extremities.   MSK: no deformities. Right wrist: mildly warm, no erythema, mildly swollen, tender with palpation and ROM. Left knee: effusion present, not warm, pain with ROM. Left great toe: not warm, not swollen, pain with ROM.   Integumentary/Skin: no rash, normal color  Psych: normal affect,  normal behavior    ED Course      Procedures        Critical Care time:  none       Labs Ordered and Resulted from Time of ED Arrival Up to the Time of Departure from the ED - No data to display         Assessments & Plan (with Medical Decision Making)   Patient presenting with pain in multiple joints. Vitals in the ED wnl. Chart review via Stan performed. Patient with 5 previous ED visits in the past 2 weeks for the same symptoms. Very thorough work-up completed during previous visits, most notable for arthrocentesis at [facility] that had normal WBC and negative crystals, negative culture. Blood WBC has been normal but rising inflammatory markers. Blood cultures from [facility] negative.     Given multiple joint involvement with no systemic symptoms of infection and normal temperature/WBC, inflammatory polyarthropathy is most likely. Septic arthritis and crystal arthropathy are unlikely given the findings from studies at the patient's previous ED visits. The patient had several questions about her work-up thus far, which were answered. Patient ultimately will benefit most from seeing Rheumology, which she states is scheduled at [facility] in 3 days.     After counseling on the diagnosis, work-up, and treatment plan, the patient was discharged to home. Recommended patient continue the prednisone and tramadol, start naproxen as well. The patient was advised to follow-up with [facility] Rheumatology and IM as already scheduled in the next few days. The patient was advised to return to the ED if worsening symptoms, fever, or if there are any urgent/life-threatening concerns.       Clinical Impression:  Inflammatory polyarthropathy       Efraín Collins MD  Emergency Medicine     I have reviewed the nursing notes.  I have reviewed the findings, diagnosis, plan and need for follow up with the patient.  Current Discharge Medication List          Final diagnoses:   Inflammatory polyarthropathy (H)       3/11/2019   [facility],  EMERGENCY DEPARTMENT     Efraín Collins MD  03/11/19 1052

## 2019-03-11 NOTE — ED TRIAGE NOTES
"Patient has had increased bilateral pain in feet for last 2 weeks, left foot/ankle/knee swelling otherwise CMS intact. Right wrist pain with possible deformity, swelling and decreased mobility for 2 weeks, pulses +2 CMS intact. Patient states no trauma to cause these pains and has been seen a few times for the same symptoms without any relief.     Patient has left eye bruise patient says she fell and hit face while trying to use questions and bruises on right arm patient relates to blood draws. Patient makes comments of a good relationship with boyfriend for last 14 years. Patient is alone and nurse asked patient if anyone is harming patient, patient said \"No\" and nurse asked the abuse screen questions with \"No\" answers.   "

## 2019-03-11 NOTE — ED PROVIDER NOTES
"  History     Chief Complaint   Patient presents with     Joint Pain     pain to right hand, left knee, and bilateral feet     HPI  Jess Hudson is a 53 year old female who presents for evaluation of pain in her left knee, right wrist, and bilateral feet. Patient reports sudden onset of pain in her feet 2 weeks ago, on 2/25/19, after standing from her couch. She has since developed pain in her left knee and right wrist as well. Per chart review, since symptom onset, patient has been evaluated 6 times at various EDs --- [facility], [facility], and [facility]. She was most recently discharged from [facility] 13 hours ago. Patient has had a variety of work-up completed in her prior visits, most notable for an arthrocentesis at [facility] with normal WBC and negative crystals, negative culture.  Patient's screen for gonorrhea and chlamydia were negative.  She was also noted to have worsening inflammatory markers it's been suspected her symptoms are the result of inflammatory polyarthropathy given the multiple joint involvement with no systemic symptoms of infection and normal temperature/WBC. She's been prescribed both prednisone and tramadol and has been advised to follow-up with [facility] Rheumatology and IM as scheduled.     Patient returns tonight due to continued left knee, right wrist, and foot pain. She also complains of numbness in her right hand and fingers. Patient states she took her morning doses of prednisone and tramadol but has not had any further doses as she's been \"trapped on campus all day\". She denies fevers. 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 Patient is planning on following up with Rheumatology.    Patient denies any fall or injury.  She states she did not strike her left eye with her crutch.  She denies any visual disturbance.  No headache.  No photophobia.  No nausea or vomiting.    Past Medical History:   Diagnosis Date     " Kidney stone        Past Surgical History:   Procedure Laterality Date     APPENDECTOMY       BREAST SURGERY      enlarged     CYSTOSCOPY, REMOVE STENT(S), COMBINED  2012    Procedure:COMBINED CYSTOSCOPY, REMOVE STENT(S); Surgeon:VAISHALI COX; Location: OR     CYSTOSCOPY, RETROGRADES, INSERT STENT URETER(S), COMBINED  2012    Procedure:COMBINED CYSTOSCOPY, RETROGRADES, INSERT STENT URETER(S); Cysto,Attempted Right Double J Stent Placement; Surgeon:VAISHALI COX; Location: OR     EXTRACORPOREAL SHOCK WAVE LITHOTRIPSY (ESWL)  2012    Procedure:EXTRACORPOREAL SHOCK WAVE LITHOTRIPSY (ESWL); Right  EXTRACORPOREAL SHOCK WAVE LITHOTRIPSY, removal of right stent; Surgeon:VAISHALI COX; Location: OR     LASER HOLMIUM LITHOTRIPSY URETER(S), INSERT STENT, COMBINED Left 2017    Procedure: COMBINED CYSTOSCOPY, URETEROSCOPY, LASER HOLMIUM LITHOTRIPSY URETER(S), INSERT STENT;  Surgeon: Vaishali Cox MD;  Location:  OR       No family history on file.    Social History     Tobacco Use     Smoking status: Former Smoker     Types: Cigarettes     Last attempt to quit: 2019     Years since quittin.1     Smokeless tobacco: Never Used   Substance Use Topics     Alcohol use: Yes     Comment: occasional alcohol 1 drink/month       Current Facility-Administered Medications   Medication     cefdinir (OMNICEF) capsule 300 mg     Current Outpatient Medications   Medication     cefdinir (OMNICEF) 300 MG capsule     oxybutynin (DITROPAN XL) 5 MG 24 hr tablet     oxyCODONE (ROXICODONE) 5 MG IR tablet     predniSONE (DELTASONE) 10 MG tablet      No Known Allergies    I have reviewed the Medications, Allergies, Past Medical and Surgical History, and Social History in the Epic system.    Review of Systems   Constitutional: Negative for fever.   HENT: Negative for congestion.    Eyes: Negative for redness.   Respiratory: Negative for shortness of breath.    Cardiovascular: Negative for chest pain.    Gastrointestinal: Negative for abdominal pain.   Genitourinary: Negative for difficulty urinating.   Musculoskeletal: Positive for arthralgias (left knee and right wrist). Negative for neck stiffness.   Skin: Negative for color change.   Neurological: Positive for numbness (right hand). Negative for headaches.   Psychiatric/Behavioral: Negative for confusion.   All other systems reviewed and are negative.      Physical Exam   BP: 117/59  Pulse: 88  Temp: 98.3  F (36.8  C)  Resp: 18  SpO2: 99 %      Physical Exam   Constitutional: She appears well-developed and well-nourished. She appears distressed.   HENT:   Head: Normocephalic and atraumatic.       Mouth/Throat: Oropharynx is clear and moist. No oropharyngeal exudate.   Eyes: Pupils are equal, round, and reactive to light. No scleral icterus.   Neck: Normal range of motion.   Cardiovascular: Normal rate, regular rhythm, normal heart sounds and intact distal pulses.   Pulmonary/Chest: Effort normal and breath sounds normal. No respiratory distress.   Abdominal: Soft. Bowel sounds are normal. There is no tenderness.   Musculoskeletal: She exhibits no edema.        Left knee: She exhibits decreased range of motion and swelling. Tenderness found.        Left ankle: She exhibits normal range of motion.        Right hand: She exhibits decreased range of motion and swelling. She exhibits normal capillary refill. Normal sensation noted. Normal strength noted.        Left hand: Normal. She exhibits no swelling. Normal sensation noted. Normal strength noted.        Left lower leg: She exhibits swelling (not pitting).        Left foot: There is swelling. There is normal range of motion and normal capillary refill.   Neurological: She is alert. She has normal strength. Gait normal.   Skin: Skin is warm. Capillary refill takes less than 2 seconds. No rash noted. She is not diaphoretic.   Nursing note and vitals reviewed.      ED Course        Procedures       5:39 PM  The  patient was seen and examined by Dr. Chavez in [facility].          EKG Interpretation:      Interpreted by AUDELIA CHAVEZ MD  Time reviewed: 1855  Symptoms at time of EKG: swelling   Rhythm: normal sinus   Rate: 87  Axis: normal  Ectopy: none  Conduction: normal  ST Segments/ T Waves: No ST-T wave changes  Q Waves: none  Comparison to prior: No old EKG available    Clinical Impression: normal EKG    Results for orders placed or performed during the hospital encounter of 03/11/19   US Lower Extremity Venous Duplex Left    Narrative    EXAMINATION: DOPPLER VENOUS ULTRASOUND OF THE LEFT LOWER EXTREMITY,  3/11/2019 7:14 PM     INDICATION: Swelling    COMPARISON: None.    TECHNIQUE:  Gray-scale evaluation with compression, spectral flow, and  color Doppler assessment of the deep venous system of the left leg  from groin to knee, and then at the ankle.    FINDINGS:  In the left lower extremity, the common femoral, femoral, popliteal  and posterior tibial veins demonstrate normal compressibility and  blood flow.      Impression    IMPRESSION:  1.  No evidence left lower extremity deep venous thrombosis.    I have personally reviewed the examination and initial interpretation  and I agree with the findings.    SANTI JJ MD   XR Chest 2 Views    Narrative    PRELIMINARY REPORT - The following report is a preliminary  interpretation.      Impression    IMPRESSION: No acute cardiopulmonary findings.    CT Abdomen Pelvis w/o Contrast    Narrative    PRELIMINARY REPORT - The following report is a preliminary  interpretation.      Impression    IMPRESSION: No clear evidence of urinary calculi.    UA with Microscopic   Result Value Ref Range    Color Urine Yellow     Appearance Urine Slightly Cloudy     Glucose Urine Negative NEG^Negative mg/dL    Bilirubin Urine Negative NEG^Negative    Ketones Urine Negative NEG^Negative mg/dL    Specific Gravity Urine 1.017 1.003 - 1.035    Blood Urine Small (A) NEG^Negative    pH Urine  6.5 5.0 - 7.0 pH    Protein Albumin Urine 10 (A) NEG^Negative mg/dL    Urobilinogen mg/dL 2.0 0.0 - 2.0 mg/dL    Nitrite Urine Negative NEG^Negative    Leukocyte Esterase Urine Large (A) NEG^Negative    Source Midstream Urine     WBC Urine >182 (H) 0 - 5 /HPF    RBC Urine 132 (H) 0 - 2 /HPF    WBC Clumps Present (A) NEG^Negative /HPF    Bacteria Urine Few (A) NEG^Negative /HPF    Squamous Epithelial /HPF Urine 10 (H) 0 - 1 /HPF   Basic metabolic panel   Result Value Ref Range    Sodium 137 133 - 144 mmol/L    Potassium 4.1 3.4 - 5.3 mmol/L    Chloride 103 94 - 109 mmol/L    Carbon Dioxide 23 20 - 32 mmol/L    Anion Gap 11 3 - 14 mmol/L    Glucose 100 (H) 70 - 99 mg/dL    Urea Nitrogen 23 7 - 30 mg/dL    Creatinine 0.58 0.52 - 1.04 mg/dL    GFR Estimate >90 >60 mL/min/[1.73_m2]    GFR Estimate If Black >90 >60 mL/min/[1.73_m2]    Calcium 8.2 (L) 8.5 - 10.1 mg/dL   CRP inflammation   Result Value Ref Range    CRP Inflammation 53.0 (H) 0.0 - 8.0 mg/L   Nt probnp inpatient   Result Value Ref Range    N-Terminal Pro BNP Inpatient 1,075 (H) 0 - 900 pg/mL   Erythrocyte sedimentation rate auto   Result Value Ref Range    Sed Rate 47 (H) 0 - 30 mm/h   Troponin I   Result Value Ref Range    Troponin I ES <0.015 0.000 - 0.045 ug/L   HCG qualitative   Result Value Ref Range    HCG Qualitative Serum Negative NEG^Negative          Critical Care time:       Patient refused IV and dose of ceftriaxone.         Assessments & Plan (with Medical Decision Making)   53 year old female with 2-week history of polyarthritis.  She has been evaluated extensively previously with negative ultrasound, negative arthrocentesis for crystals and infection.  Patient was recently started on prednisone and today has improved CRP but stable sed rate.  The patient's kidney function is normal as are her electrolytes.  She has an elevated BNP but no changes on her EKG and a negative troponin.  Her chest radiograph is not consistent with heart failure.  Her  urine does appear infected.  The patient refused an IV and IV ceftriaxone.  Her symptoms were adequately controlled with a single dose of tramadol.  She has at home to take for her pain.  The patient was given Omnicef here in the emergency department.  She will be discharged home with the same.  She was encouraged to follow-up with rheumatology as scheduled at [facility].    I have reviewed the nursing notes.    I have reviewed the findings, diagnosis, plan and need for follow up with the patient.       Medication List      Started    cefdinir 300 MG capsule  Commonly known as:  OMNICEF  300 mg, Oral, 2 TIMES DAILY        ASK your doctor about these medications    traMADol 50 MG tablet  Commonly known as:  ULTRAM   mg, Oral, EVERY 6 HOURS PRN  Ask about: Should I take this medication?            Final diagnoses:   Polyarthritis   Acute cystitis with hematuria   IEddie, am serving as a trained medical scribe to document services personally performed by Mau Connor MD, based on the provider's statements to me.   IMau MD, was physically present and have reviewed and verified the accuracy of this note documented by Eddie De Dios.    3/11/2019   [facility] [place], EMERGENCY DEPARTMENT     Mau Connor MD  03/12/19 0155

## 2019-03-11 NOTE — DISCHARGE INSTRUCTIONS
Continue your prednisone and tramadol. Start 375-500 mg naproxen twice per day. Do not take indomethacin nor ibuprofen when taking naproxen.     Please make an appointment to follow up with [facility] medicine in the next few days as you already scheduled.      Return to the ED if you are having fever, worsening symptoms, or any urgent/life-threatening concerns.

## 2019-03-12 ENCOUNTER — APPOINTMENT (OUTPATIENT)
Dept: CT IMAGING | Facility: CLINIC | Age: 54
End: 2019-03-12
Attending: EMERGENCY MEDICINE
Payer: MEDICAID

## 2019-03-12 VITALS
TEMPERATURE: 99.1 F | RESPIRATION RATE: 16 BRPM | SYSTOLIC BLOOD PRESSURE: 130 MMHG | HEART RATE: 62 BPM | DIASTOLIC BLOOD PRESSURE: 88 MMHG | OXYGEN SATURATION: 96 %

## 2019-03-12 LAB — INTERPRETATION ECG - MUSE: NORMAL

## 2019-03-12 PROCEDURE — 25000132 ZZH RX MED GY IP 250 OP 250 PS 637: Performed by: EMERGENCY MEDICINE

## 2019-03-12 PROCEDURE — 74176 CT ABD & PELVIS W/O CONTRAST: CPT

## 2019-03-12 RX ORDER — CEFDINIR 300 MG/1
300 CAPSULE ORAL EVERY 12 HOURS SCHEDULED
Status: COMPLETED | OUTPATIENT
Start: 2019-03-12 | End: 2019-03-12

## 2019-03-12 RX ORDER — CEFDINIR 300 MG/1
300 CAPSULE ORAL 2 TIMES DAILY
Qty: 14 CAPSULE | Refills: 0 | Status: SHIPPED | OUTPATIENT
Start: 2019-03-12 | End: 2019-03-19

## 2019-03-12 RX ORDER — CEFDINIR 300 MG/1
300 CAPSULE ORAL EVERY 12 HOURS SCHEDULED
Status: DISCONTINUED | OUTPATIENT
Start: 2019-03-12 | End: 2019-03-12

## 2019-03-12 RX ADMIN — CEFDINIR 300 MG: 300 CAPSULE ORAL at 02:31

## 2019-03-12 NOTE — DISCHARGE INSTRUCTIONS
Take complete course of cefdinir.  Continue prednisone.  Continue tramadol as needed for pain.    Follow-up with Rheumatology as planned.

## 2019-03-13 LAB
BACTERIA SPEC CULT: ABNORMAL
BACTERIA SPEC CULT: ABNORMAL
Lab: ABNORMAL
SPECIMEN SOURCE: ABNORMAL

## 2019-03-13 NOTE — RESULT ENCOUNTER NOTE
Emergency Dept/Urgent Care discharge antibiotic (if prescribed): Cefdinir (Omnicef) 300 mg capsule, 1 capsule (300 mg) by mouth 2 times daily for 7 days  Date of Rx (if applicable):  3/11/19  No changes in treatment per Urine culture protocol.

## 2021-05-28 ENCOUNTER — RECORDS - HEALTHEAST (OUTPATIENT)
Dept: ADMINISTRATIVE | Facility: CLINIC | Age: 56
End: 2021-05-28

## 2021-05-29 ENCOUNTER — RECORDS - HEALTHEAST (OUTPATIENT)
Dept: ADMINISTRATIVE | Facility: CLINIC | Age: 56
End: 2021-05-29

## 2022-02-09 NOTE — ED NOTES
[facility]  ED Nurse Handoff Report    ED Chief complaint: Flank Pain      ED Diagnosis:   Final diagnoses:   Renal colic       Code Status: Not on file    Allergies:   Allergies   Allergen Reactions     Amoxicillin      As a child, unknown       Activity level:  Independent     Needed?: No    Isolation: No  Infection: Not Applicable    Bariatric?: No      Vital Signs:   Filed Vitals:    01/30/17 0912 01/30/17 0955 01/30/17 1054 01/30/17 1055   BP:  131/88 119/81    Pulse:       Temp: 97.1  F (36.2  C)      TempSrc: Oral      Resp:       SpO2:  98% 100% 98%       Cardiac Rhythm: ,        Pain level: 0-10 Pain Scale: 10    Is this patient confused?: No    Patient Report: Initial Complaint: Pt presented to the ED with severe back pain and nausea.  Focused Assessment:  Pt tachycardic and writhing in pain before administration of IV pain  Medication.  Pt initially too focused on pain to answer questions.  Pt found to be alert and oriented x4 once pain was controlled.  Tests Performed: Abdominal CT, UA/UC.   Abnormal Results: Multiple renal stones present, see CT report.  Treatments provided: Pt Given 2 liter ns IV, 1 mg IM dilaudid, 1 mg IM ativan, 0.5mg dilaudid x2, , 30 mg iV toradol x2, 4 mg zofran x2, and 1 percocet tab.    Family Comments: Mother at bedside, involved in cares.    OBS brochure/video discussed/provided to patient: No, pt too somnolent after pain medication to focus on video.    ED Medications:   Medications   0.9% sodium chloride BOLUS (1,000 mLs Intravenous New Bag 1/30/17 1251)     Followed by   0.9% sodium chloride infusion (not administered)   HYDROmorphone (PF) (DILAUDID) injection 0.5 mg (0.5 mg Intravenous Given 1/30/17 1252)   ondansetron (ZOFRAN) injection 4 mg (4 mg Intravenous Given 1/30/17 1253)   HYDROmorphone (PF) (DILAUDID) injection 0.5 mg (not administered)   ketorolac (TORADOL) injection 30 mg (30 mg Intravenous Given 1/30/17 7863)   HYDROmorphone  (DILAUDID) injection 1 mg (1 mg Intramuscular Given 1/30/17 0709)   LORazepam (ATIVAN) injection 1 mg (1 mg Intramuscular Given 1/30/17 0731)   oxyCODONE-acetaminophen (PERCOCET) 5-325 MG per tablet 1 tablet (1 tablet Oral Given 1/30/17 1230)   ketorolac (TORADOL) injection 30 mg (30 mg Intravenous Given 1/30/17 1252)       Drips infusing?:  No      ED NURSE PHONE NUMBER: [phone]            no

## (undated) DEVICE — SOL WATER IRRIG 3000ML BAG 2B7117

## (undated) DEVICE — EVACUATOR BLADDER UROVAC LATEX M0067301250

## (undated) DEVICE — GUIDEWIRE URO STR STIFF .035"X150CM NITINOL 150NSS35

## (undated) DEVICE — PAD CHUX UNDERPAD 23X24" 7136

## (undated) DEVICE — GLOVE PROTEXIS W/NEU-THERA 7.5  2D73TE75

## (undated) DEVICE — CATH URETERAL OPEN END 6FR AXXCESS

## (undated) DEVICE — Device

## (undated) DEVICE — WIRE GUIDE NITINOL 1.2MX34CM

## (undated) DEVICE — LASER FIBER HOLMIUM FLEXIVA 365UM M0068403920

## (undated) DEVICE — SYR 50ML CATH TIP W/O NDL 309620

## (undated) DEVICE — CATH URETERAL DL 6FR FLEX-TIP 10FRX50CM G17323 AQ-022610

## (undated) DEVICE — PACK CYSTOSCOPY SBA15CYFSI

## (undated) DEVICE — SOL WATER IRRIG 1000ML BOTTLE 2F7114

## (undated) DEVICE — LINEN TOWEL PACK X5 5464

## (undated) DEVICE — WIRE GUIDE AMPLATZ SUPER STIFF 0.035"X145CM 46-524

## (undated) DEVICE — CATH TRAY FOLEY 16FR BARDEX W/DRAIN BAG STATLOCK 300316A

## (undated) RX ORDER — KETOROLAC TROMETHAMINE 30 MG/ML
INJECTION, SOLUTION INTRAMUSCULAR; INTRAVENOUS
Status: DISPENSED
Start: 2017-01-31

## (undated) RX ORDER — FENTANYL CITRATE 50 UG/ML
INJECTION, SOLUTION INTRAMUSCULAR; INTRAVENOUS
Status: DISPENSED
Start: 2017-01-31

## (undated) RX ORDER — FUROSEMIDE 10 MG/ML
INJECTION INTRAMUSCULAR; INTRAVENOUS
Status: DISPENSED
Start: 2017-01-31

## (undated) RX ORDER — LIDOCAINE HYDROCHLORIDE 20 MG/ML
INJECTION, SOLUTION EPIDURAL; INFILTRATION; INTRACAUDAL; PERINEURAL
Status: DISPENSED
Start: 2017-01-31

## (undated) RX ORDER — CEFAZOLIN SODIUM 2 G/100ML
INJECTION, SOLUTION INTRAVENOUS
Status: DISPENSED
Start: 2017-01-31

## (undated) RX ORDER — PROPOFOL 10 MG/ML
INJECTION, EMULSION INTRAVENOUS
Status: DISPENSED
Start: 2017-01-31

## (undated) RX ORDER — DEXAMETHASONE SODIUM PHOSPHATE 4 MG/ML
INJECTION, SOLUTION INTRA-ARTICULAR; INTRALESIONAL; INTRAMUSCULAR; INTRAVENOUS; SOFT TISSUE
Status: DISPENSED
Start: 2017-01-31

## (undated) RX ORDER — ONDANSETRON 2 MG/ML
INJECTION INTRAMUSCULAR; INTRAVENOUS
Status: DISPENSED
Start: 2017-01-31

## (undated) RX ORDER — GLYCOPYRROLATE 0.2 MG/ML
INJECTION, SOLUTION INTRAMUSCULAR; INTRAVENOUS
Status: DISPENSED
Start: 2017-01-31